# Patient Record
Sex: MALE | Race: WHITE | NOT HISPANIC OR LATINO | URBAN - METROPOLITAN AREA
[De-identification: names, ages, dates, MRNs, and addresses within clinical notes are randomized per-mention and may not be internally consistent; named-entity substitution may affect disease eponyms.]

---

## 2018-04-20 ENCOUNTER — APPOINTMENT (OUTPATIENT)
Dept: RADIOLOGY | Facility: MEDICAL CENTER | Age: 43
End: 2018-04-20
Attending: EMERGENCY MEDICINE
Payer: COMMERCIAL

## 2018-04-20 ENCOUNTER — HOSPITAL ENCOUNTER (OUTPATIENT)
Facility: MEDICAL CENTER | Age: 43
End: 2018-04-21
Attending: EMERGENCY MEDICINE | Admitting: INTERNAL MEDICINE
Payer: COMMERCIAL

## 2018-04-20 ENCOUNTER — RESOLUTE PROFESSIONAL BILLING HOSPITAL PROF FEE (OUTPATIENT)
Dept: HOSPITALIST | Facility: MEDICAL CENTER | Age: 43
End: 2018-04-20
Payer: COMMERCIAL

## 2018-04-20 DIAGNOSIS — I50.9 CONGESTIVE HEART FAILURE, UNSPECIFIED CONGESTIVE HEART FAILURE CHRONICITY, UNSPECIFIED CONGESTIVE HEART FAILURE TYPE: ICD-10-CM

## 2018-04-20 DIAGNOSIS — R60.9 EDEMA, UNSPECIFIED TYPE: ICD-10-CM

## 2018-04-20 PROBLEM — E87.70 FLUID OVERLOAD: Status: ACTIVE | Noted: 2018-04-20

## 2018-04-20 PROBLEM — F17.200 SMOKING: Status: ACTIVE | Noted: 2018-04-20

## 2018-04-20 PROBLEM — E88.09 HYPOALBUMINEMIA: Status: ACTIVE | Noted: 2018-04-20

## 2018-04-20 PROBLEM — N17.9 AKI (ACUTE KIDNEY INJURY) (HCC): Status: ACTIVE | Noted: 2018-04-20

## 2018-04-20 PROBLEM — N04.9 NEPHROTIC SYNDROME: Status: ACTIVE | Noted: 2018-04-20

## 2018-04-20 LAB
ALBUMIN SERPL BCP-MCNC: 1.6 G/DL (ref 3.2–4.9)
ALBUMIN/GLOB SERPL: 0.5 G/DL
ALP SERPL-CCNC: 197 U/L (ref 30–99)
ALT SERPL-CCNC: 8 U/L (ref 2–50)
ANION GAP SERPL CALC-SCNC: 7 MMOL/L (ref 0–11.9)
APTT PPP: 35.7 SEC (ref 24.7–36)
AST SERPL-CCNC: 16 U/L (ref 12–45)
BASOPHILS # BLD AUTO: 1.2 % (ref 0–1.8)
BASOPHILS # BLD: 0.11 K/UL (ref 0–0.12)
BILIRUB SERPL-MCNC: 0.1 MG/DL (ref 0.1–1.5)
BNP SERPL-MCNC: 156 PG/ML (ref 0–100)
BUN SERPL-MCNC: 26 MG/DL (ref 8–22)
CALCIUM SERPL-MCNC: 7.4 MG/DL (ref 8.5–10.5)
CHLORIDE SERPL-SCNC: 108 MMOL/L (ref 96–112)
CO2 SERPL-SCNC: 20 MMOL/L (ref 20–33)
CREAT SERPL-MCNC: 1.78 MG/DL (ref 0.5–1.4)
EOSINOPHIL # BLD AUTO: 0.36 K/UL (ref 0–0.51)
EOSINOPHIL NFR BLD: 3.9 % (ref 0–6.9)
ERYTHROCYTE [DISTWIDTH] IN BLOOD BY AUTOMATED COUNT: 45.1 FL (ref 35.9–50)
GLOBULIN SER CALC-MCNC: 3.5 G/DL (ref 1.9–3.5)
GLUCOSE SERPL-MCNC: 93 MG/DL (ref 65–99)
HCT VFR BLD AUTO: 31.6 % (ref 42–52)
HGB BLD-MCNC: 10.4 G/DL (ref 14–18)
IMM GRANULOCYTES # BLD AUTO: 0.03 K/UL (ref 0–0.11)
IMM GRANULOCYTES NFR BLD AUTO: 0.3 % (ref 0–0.9)
INR PPP: 1.07 (ref 0.87–1.13)
LIPASE SERPL-CCNC: 12 U/L (ref 11–82)
LYMPHOCYTES # BLD AUTO: 2.63 K/UL (ref 1–4.8)
LYMPHOCYTES NFR BLD: 28.5 % (ref 22–41)
MCH RBC QN AUTO: 27.9 PG (ref 27–33)
MCHC RBC AUTO-ENTMCNC: 32.9 G/DL (ref 33.7–35.3)
MCV RBC AUTO: 84.7 FL (ref 81.4–97.8)
MONOCYTES # BLD AUTO: 0.79 K/UL (ref 0–0.85)
MONOCYTES NFR BLD AUTO: 8.5 % (ref 0–13.4)
NEUTROPHILS # BLD AUTO: 5.32 K/UL (ref 1.82–7.42)
NEUTROPHILS NFR BLD: 57.6 % (ref 44–72)
NRBC # BLD AUTO: 0 K/UL
NRBC BLD-RTO: 0 /100 WBC
PLATELET # BLD AUTO: 437 K/UL (ref 164–446)
PMV BLD AUTO: 9.2 FL (ref 9–12.9)
POTASSIUM SERPL-SCNC: 3.9 MMOL/L (ref 3.6–5.5)
PROT SERPL-MCNC: 5.1 G/DL (ref 6–8.2)
PROTHROMBIN TIME: 13.6 SEC (ref 12–14.6)
RBC # BLD AUTO: 3.73 M/UL (ref 4.7–6.1)
SODIUM SERPL-SCNC: 135 MMOL/L (ref 135–145)
TROPONIN I SERPL-MCNC: 0.01 NG/ML (ref 0–0.04)
WBC # BLD AUTO: 9.2 K/UL (ref 4.8–10.8)

## 2018-04-20 PROCEDURE — 84484 ASSAY OF TROPONIN QUANT: CPT

## 2018-04-20 PROCEDURE — 306589 SLEEVE,VASO CALF LARGE: Performed by: INTERNAL MEDICINE

## 2018-04-20 PROCEDURE — 99407 BEHAV CHNG SMOKING > 10 MIN: CPT | Performed by: INTERNAL MEDICINE

## 2018-04-20 PROCEDURE — 99285 EMERGENCY DEPT VISIT HI MDM: CPT

## 2018-04-20 PROCEDURE — 99220 PR INITIAL OBSERVATION CARE,LEVL III: CPT | Mod: 25 | Performed by: INTERNAL MEDICINE

## 2018-04-20 PROCEDURE — 85730 THROMBOPLASTIN TIME PARTIAL: CPT

## 2018-04-20 PROCEDURE — 96372 THER/PROPH/DIAG INJ SC/IM: CPT | Mod: XU

## 2018-04-20 PROCEDURE — 700102 HCHG RX REV CODE 250 W/ 637 OVERRIDE(OP): Performed by: INTERNAL MEDICINE

## 2018-04-20 PROCEDURE — G0378 HOSPITAL OBSERVATION PER HR: HCPCS

## 2018-04-20 PROCEDURE — 71045 X-RAY EXAM CHEST 1 VIEW: CPT

## 2018-04-20 PROCEDURE — 93005 ELECTROCARDIOGRAM TRACING: CPT | Performed by: EMERGENCY MEDICINE

## 2018-04-20 PROCEDURE — 96374 THER/PROPH/DIAG INJ IV PUSH: CPT

## 2018-04-20 PROCEDURE — A9270 NON-COVERED ITEM OR SERVICE: HCPCS | Performed by: INTERNAL MEDICINE

## 2018-04-20 PROCEDURE — 85610 PROTHROMBIN TIME: CPT

## 2018-04-20 PROCEDURE — 96375 TX/PRO/DX INJ NEW DRUG ADDON: CPT

## 2018-04-20 PROCEDURE — 85025 COMPLETE CBC W/AUTO DIFF WBC: CPT

## 2018-04-20 PROCEDURE — 700111 HCHG RX REV CODE 636 W/ 250 OVERRIDE (IP): Performed by: INTERNAL MEDICINE

## 2018-04-20 PROCEDURE — 83690 ASSAY OF LIPASE: CPT

## 2018-04-20 PROCEDURE — 83880 ASSAY OF NATRIURETIC PEPTIDE: CPT

## 2018-04-20 PROCEDURE — 700101 HCHG RX REV CODE 250: Performed by: INTERNAL MEDICINE

## 2018-04-20 PROCEDURE — 80053 COMPREHEN METABOLIC PANEL: CPT

## 2018-04-20 RX ORDER — AMOXICILLIN 250 MG
2 CAPSULE ORAL 2 TIMES DAILY
Status: DISCONTINUED | OUTPATIENT
Start: 2018-04-20 | End: 2018-04-21 | Stop reason: HOSPADM

## 2018-04-20 RX ORDER — HEPARIN SODIUM 5000 [USP'U]/ML
5000 INJECTION, SOLUTION INTRAVENOUS; SUBCUTANEOUS EVERY 8 HOURS
Status: DISCONTINUED | OUTPATIENT
Start: 2018-04-20 | End: 2018-04-21 | Stop reason: HOSPADM

## 2018-04-20 RX ORDER — PREDNISONE 20 MG/1
80 TABLET ORAL EVERY MORNING
Status: ON HOLD | COMMUNITY
End: 2018-04-21

## 2018-04-20 RX ORDER — NICOTINE 21 MG/24HR
14 PATCH, TRANSDERMAL 24 HOURS TRANSDERMAL
Status: DISCONTINUED | OUTPATIENT
Start: 2018-04-20 | End: 2018-04-21 | Stop reason: HOSPADM

## 2018-04-20 RX ORDER — PREDNISONE 20 MG/1
80 TABLET ORAL EVERY MORNING
Status: DISCONTINUED | OUTPATIENT
Start: 2018-04-20 | End: 2018-04-21 | Stop reason: HOSPADM

## 2018-04-20 RX ORDER — LABETALOL HYDROCHLORIDE 5 MG/ML
5 INJECTION, SOLUTION INTRAVENOUS EVERY 4 HOURS PRN
Status: DISCONTINUED | OUTPATIENT
Start: 2018-04-20 | End: 2018-04-21 | Stop reason: HOSPADM

## 2018-04-20 RX ORDER — LISINOPRIL 20 MG/1
20 TABLET ORAL EVERY MORNING
Status: ON HOLD | COMMUNITY
End: 2018-04-21

## 2018-04-20 RX ORDER — LISINOPRIL 20 MG/1
20 TABLET ORAL EVERY MORNING
Status: DISCONTINUED | OUTPATIENT
Start: 2018-04-20 | End: 2018-04-21 | Stop reason: HOSPADM

## 2018-04-20 RX ORDER — POLYETHYLENE GLYCOL 3350 17 G/17G
1 POWDER, FOR SOLUTION ORAL
Status: DISCONTINUED | OUTPATIENT
Start: 2018-04-20 | End: 2018-04-21 | Stop reason: HOSPADM

## 2018-04-20 RX ORDER — BISACODYL 10 MG
10 SUPPOSITORY, RECTAL RECTAL
Status: DISCONTINUED | OUTPATIENT
Start: 2018-04-20 | End: 2018-04-21 | Stop reason: HOSPADM

## 2018-04-20 RX ORDER — FUROSEMIDE 10 MG/ML
40 INJECTION INTRAMUSCULAR; INTRAVENOUS
Status: DISCONTINUED | OUTPATIENT
Start: 2018-04-20 | End: 2018-04-21 | Stop reason: HOSPADM

## 2018-04-20 RX ORDER — ACETAMINOPHEN 325 MG/1
650 TABLET ORAL EVERY 6 HOURS PRN
Status: DISCONTINUED | OUTPATIENT
Start: 2018-04-20 | End: 2018-04-21 | Stop reason: HOSPADM

## 2018-04-20 RX ORDER — FUROSEMIDE 80 MG
80 TABLET ORAL EVERY MORNING
Status: ON HOLD | COMMUNITY
End: 2018-04-21

## 2018-04-20 RX ADMIN — PREDNISONE 80 MG: 20 TABLET ORAL at 17:21

## 2018-04-20 RX ADMIN — FUROSEMIDE 40 MG: 10 INJECTION, SOLUTION INTRAMUSCULAR; INTRAVENOUS at 17:25

## 2018-04-20 RX ADMIN — LISINOPRIL 20 MG: 20 TABLET ORAL at 17:21

## 2018-04-20 RX ADMIN — NICOTINE 14 MG: 14 PATCH, EXTENDED RELEASE TRANSDERMAL at 17:20

## 2018-04-20 RX ADMIN — LABETALOL HYDROCHLORIDE 5 MG: 5 INJECTION, SOLUTION INTRAVENOUS at 19:58

## 2018-04-20 RX ADMIN — HEPARIN SODIUM 5000 UNITS: 5000 INJECTION, SOLUTION INTRAVENOUS; SUBCUTANEOUS at 21:26

## 2018-04-20 RX ADMIN — ACETAMINOPHEN 650 MG: 325 TABLET, FILM COATED ORAL at 19:58

## 2018-04-20 ASSESSMENT — PATIENT HEALTH QUESTIONNAIRE - PHQ9
1. LITTLE INTEREST OR PLEASURE IN DOING THINGS: NOT AT ALL
2. FEELING DOWN, DEPRESSED, IRRITABLE, OR HOPELESS: NOT AT ALL
SUM OF ALL RESPONSES TO PHQ9 QUESTIONS 1 AND 2: 0

## 2018-04-20 ASSESSMENT — ENCOUNTER SYMPTOMS
WEIGHT LOSS: 0
DIARRHEA: 0
ABDOMINAL PAIN: 0
EYE PAIN: 0
SPEECH CHANGE: 0
BACK PAIN: 0
HEARTBURN: 0
NECK PAIN: 0
FALLS: 0
PND: 0
CHILLS: 0
VOMITING: 0
DEPRESSION: 0
SEIZURES: 0
NAUSEA: 0
HEADACHES: 0
BLURRED VISION: 0
FEVER: 0
WEAKNESS: 0
TREMORS: 0
WHEEZING: 0
PALPITATIONS: 0
CONSTIPATION: 0
DIZZINESS: 0
SHORTNESS OF BREATH: 0
SPUTUM PRODUCTION: 0
COUGH: 0

## 2018-04-20 ASSESSMENT — LIFESTYLE VARIABLES
ALCOHOL_USE: NO
SUBSTANCE_ABUSE: 0
EVER_SMOKED: YES

## 2018-04-20 ASSESSMENT — PAIN SCALES - GENERAL
PAINLEVEL_OUTOF10: 8
PAINLEVEL_OUTOF10: 8

## 2018-04-20 NOTE — ED PROVIDER NOTES
"ED Provider Note    CHIEF COMPLAINT  Chief Complaint   Patient presents with   • Congestive Heart Failure   • Peripheral Edema   • Abdominal Swelling   • Hypertension       HPI  Td Watt is a 43 y.o. male here for evaluation of peripheral edema, swelling of the abdomen, blood pressure issues and possible heart failure. He states he has intermittent left-sided chest discomfort, and it is associated with severe shortness of breath. He states he recently had to have a paracentesis to remove \"4 L of fluid\" off of his abdomen. He denies back pain, or leg pain.  Pt is here visiting, and states he has a history of intermittent issues, and has required paracentesis in the past.     PAST MEDICAL HISTORY   has a past medical history of Congestive heart failure (CMS-[facility]) and Hypertension.    SOCIAL HISTORY  Social History     Social History Main Topics   • Smoking status: Current Every Day Smoker   • Smokeless tobacco: Not on file   • Alcohol use Yes   • Drug use: Yes      Comment: marijuana   • Sexual activity: Not on file       SURGICAL HISTORY  patient denies any surgical history    CURRENT MEDICATIONS  Home Medications    **Home medications have not yet been reviewed for this encounter**         ALLERGIES  No Known Allergies    REVIEW OF SYSTEMS  See HPI for further details. Review of systems as above, otherwise all other systems are negative.     PHYSICAL EXAM  Constitutional: Well developed, well nourished. Mild acute distress.  HEENT: Normocephalic, atraumatic. Posterior pharynx clear and moist.  Eyes:  EOMI. Normal sclera.  Neck: Supple, Full range of motion, nontender.  Chest/Pulmonary: clear to ausculation. Symmetrical expansion.   Cardio: Regular rate and rhythm with no murmur.   Abdomen: Soft, nontender. No peritoneal signs. No guarding. No palpable masses.  Back: No CVA tenderness, nontender midline, no step offs.  Musculoskeletal: No deformity, 2 plus edema to bilateral hands, and lower extremities.  " neurovascular intact.   Neuro: Clear speech, appropriate, cooperative, cranial nerves II-XII grossly intact.  Psych: Normal mood and affect  Skin; multiple tattoos noted. Warm and dry    Results for orders placed or performed during the hospital encounter of 04/20/18   Troponin   Result Value Ref Range    Troponin I 0.01 0.00 - 0.04 ng/mL   Btype Natriuretic Peptide   Result Value Ref Range    B Natriuretic Peptide 156 (H) 0 - 100 pg/mL   CBC with Differential   Result Value Ref Range    WBC 9.2 4.8 - 10.8 K/uL    RBC 3.73 (L) 4.70 - 6.10 M/uL    Hemoglobin 10.4 (L) 14.0 - 18.0 g/dL    Hematocrit 31.6 (L) 42.0 - 52.0 %    MCV 84.7 81.4 - 97.8 fL    MCH 27.9 27.0 - 33.0 pg    MCHC 32.9 (L) 33.7 - 35.3 g/dL    RDW 45.1 35.9 - 50.0 fL    Platelet Count 437 164 - 446 K/uL    MPV 9.2 9.0 - 12.9 fL    Neutrophils-Polys 57.60 44.00 - 72.00 %    Lymphocytes 28.50 22.00 - 41.00 %    Monocytes 8.50 0.00 - 13.40 %    Eosinophils 3.90 0.00 - 6.90 %    Basophils 1.20 0.00 - 1.80 %    Immature Granulocytes 0.30 0.00 - 0.90 %    Nucleated RBC 0.00 /100 WBC    Neutrophils (Absolute) 5.32 1.82 - 7.42 K/uL    Lymphs (Absolute) 2.63 1.00 - 4.80 K/uL    Monos (Absolute) 0.79 0.00 - 0.85 K/uL    Eos (Absolute) 0.36 0.00 - 0.51 K/uL    Baso (Absolute) 0.11 0.00 - 0.12 K/uL    Immature Granulocytes (abs) 0.03 0.00 - 0.11 K/uL    NRBC (Absolute) 0.00 K/uL   Complete Metabolic Panel (CMP)   Result Value Ref Range    Sodium 135 135 - 145 mmol/L    Potassium 3.9 3.6 - 5.5 mmol/L    Chloride 108 96 - 112 mmol/L    Co2 20 20 - 33 mmol/L    Anion Gap 7.0 0.0 - 11.9    Glucose 93 65 - 99 mg/dL    Bun 26 (H) 8 - 22 mg/dL    Creatinine 1.78 (H) 0.50 - 1.40 mg/dL    Calcium 7.4 (L) 8.5 - 10.5 mg/dL    AST(SGOT) 16 12 - 45 U/L    ALT(SGPT) 8 2 - 50 U/L    Alkaline Phosphatase 197 (H) 30 - 99 U/L    Total Bilirubin 0.1 0.1 - 1.5 mg/dL    Albumin 1.6 (L) 3.2 - 4.9 g/dL    Total Protein 5.1 (L) 6.0 - 8.2 g/dL    Globulin 3.5 1.9 - 3.5 g/dL    A-G Ratio  0.5 g/dL   Prothrombin Time   Result Value Ref Range    PT 13.6 12.0 - 14.6 sec    INR 1.07 0.87 - 1.13   APTT   Result Value Ref Range    APTT 35.7 24.7 - 36.0 sec   Lipase   Result Value Ref Range    Lipase 12 11 - 82 U/L   ESTIMATED GFR   Result Value Ref Range    GFR If African American 50 (A) >60 mL/min/1.73 m 2    GFR If Non  42 (A) >60 mL/min/1.73 m 2   EKG (ER)   Result Value Ref Range    Report       [facility]    Test Date:  2018  Pt Name:    JAMAL WHITE                  Department: ER  MRN:        [id number]                      Room:        28  Gender:     Male                         Technician: [id number]  :        1975                   Requested By:OSWALDO CHOWDARY  Order #:    [id number]                    Reading MD:    Measurements  Intervals                                Axis  Rate:       90                           P:          75  SD:         136                          QRS:        90  QRSD:       96                           T:          66  QT:         368  QTc:        451    Interpretive Statements  SINUS RHYTHM  BORDERLINE RIGHT AXIS DEVIATION  LOW VOLTAGE THROUGHOUT  BORDERLINE T ABNORMALITIES, ANT-LAT LEADS  BASELINE WANDER IN LEAD(S) V5  No previous ECG available for comparison       DX-CHEST-LIMITED (1 VIEW)   Final Result      No acute cardiopulmonary disease.            PROCEDURES     MEDICAL RECORD  I have reviewed patient's medical record and pertinent results are listed above.    COURSE & MEDICAL DECISION MAKING  I have reviewed any medical record information, laboratory studies and radiographic results as noted above.    4:25 PM  The pt will be admitted to Dr. Nolan.  The pt reports to him, a long history of nephrotic syndrome.   He is comfortable, stable, and nontoxic appearing.       FINAL IMPRESSION  1. Edema, unspecified type    2. Congestive heart failure, unspecified congestive heart failure chronicity, unspecified  congestive heart failure type (CMS-HCC)        Electronically signed by: Siva Frederick, 4/20/2018 3:56 PM

## 2018-04-20 NOTE — ED NOTES
Med rec complete per pt at bedside  Allergies reviewed - NKDA  Pt reports he has been out of his medications since he arrived in [place] on Tuesday  Pt also reports that he has been hospitalized 3 times in the last two months, received unknown ABX  Last discharge was 18 days ago, per pt

## 2018-04-20 NOTE — ED TRIAGE NOTES
.  Chief Complaint   Patient presents with   • Congestive Heart Failure   • Peripheral Edema   • Abdominal Swelling   • Hypertension   biba from home pt arrived Tuesday from [facility] to visit mother. Reports ran out of all medications 4 days ago. Pt with significant edema. Labs collected.

## 2018-04-21 ENCOUNTER — HOSPITAL ENCOUNTER (INPATIENT)
Facility: MEDICAL CENTER | Age: 43
LOS: 2 days | DRG: 871 | End: 2018-04-24
Attending: EMERGENCY MEDICINE | Admitting: HOSPITALIST
Payer: COMMERCIAL

## 2018-04-21 ENCOUNTER — APPOINTMENT (OUTPATIENT)
Dept: RADIOLOGY | Facility: MEDICAL CENTER | Age: 43
DRG: 871 | End: 2018-04-21
Attending: EMERGENCY MEDICINE
Payer: COMMERCIAL

## 2018-04-21 VITALS
DIASTOLIC BLOOD PRESSURE: 101 MMHG | RESPIRATION RATE: 19 BRPM | OXYGEN SATURATION: 96 % | HEIGHT: 73 IN | HEART RATE: 85 BPM | TEMPERATURE: 97.6 F | SYSTOLIC BLOOD PRESSURE: 157 MMHG | WEIGHT: 234.35 LBS | BODY MASS INDEX: 31.06 KG/M2

## 2018-04-21 DIAGNOSIS — N17.9 AKI (ACUTE KIDNEY INJURY) (HCC): ICD-10-CM

## 2018-04-21 DIAGNOSIS — E87.70 HYPERVOLEMIA, UNSPECIFIED HYPERVOLEMIA TYPE: ICD-10-CM

## 2018-04-21 LAB
ALBUMIN SERPL BCP-MCNC: 1.9 G/DL (ref 3.2–4.9)
ALBUMIN/GLOB SERPL: 0.5 G/DL
ALP SERPL-CCNC: 198 U/L (ref 30–99)
ALT SERPL-CCNC: 8 U/L (ref 2–50)
ANION GAP SERPL CALC-SCNC: 10 MMOL/L (ref 0–11.9)
ANION GAP SERPL CALC-SCNC: 8 MMOL/L (ref 0–11.9)
AST SERPL-CCNC: 14 U/L (ref 12–45)
BASOPHILS # BLD AUTO: 0.2 % (ref 0–1.8)
BASOPHILS # BLD AUTO: 0.2 % (ref 0–1.8)
BASOPHILS # BLD: 0.02 K/UL (ref 0–0.12)
BASOPHILS # BLD: 0.04 K/UL (ref 0–0.12)
BILIRUB SERPL-MCNC: 0.1 MG/DL (ref 0.1–1.5)
BNP SERPL-MCNC: 123 PG/ML (ref 0–100)
BUN SERPL-MCNC: 30 MG/DL (ref 8–22)
BUN SERPL-MCNC: 37 MG/DL (ref 8–22)
CALCIUM SERPL-MCNC: 7.5 MG/DL (ref 8.5–10.5)
CALCIUM SERPL-MCNC: 7.5 MG/DL (ref 8.5–10.5)
CHLORIDE SERPL-SCNC: 109 MMOL/L (ref 96–112)
CHLORIDE SERPL-SCNC: 111 MMOL/L (ref 96–112)
CO2 SERPL-SCNC: 16 MMOL/L (ref 20–33)
CO2 SERPL-SCNC: 17 MMOL/L (ref 20–33)
CREAT SERPL-MCNC: 1.88 MG/DL (ref 0.5–1.4)
CREAT SERPL-MCNC: 2.67 MG/DL (ref 0.5–1.4)
EOSINOPHIL # BLD AUTO: 0 K/UL (ref 0–0.51)
EOSINOPHIL # BLD AUTO: 0 K/UL (ref 0–0.51)
EOSINOPHIL NFR BLD: 0 % (ref 0–6.9)
EOSINOPHIL NFR BLD: 0 % (ref 0–6.9)
ERYTHROCYTE [DISTWIDTH] IN BLOOD BY AUTOMATED COUNT: 43.8 FL (ref 35.9–50)
ERYTHROCYTE [DISTWIDTH] IN BLOOD BY AUTOMATED COUNT: 44.1 FL (ref 35.9–50)
GLOBULIN SER CALC-MCNC: 4 G/DL (ref 1.9–3.5)
GLUCOSE SERPL-MCNC: 125 MG/DL (ref 65–99)
GLUCOSE SERPL-MCNC: 160 MG/DL (ref 65–99)
HCT VFR BLD AUTO: 32.9 % (ref 42–52)
HCT VFR BLD AUTO: 33.9 % (ref 42–52)
HGB BLD-MCNC: 10.9 G/DL (ref 14–18)
HGB BLD-MCNC: 10.9 G/DL (ref 14–18)
IMM GRANULOCYTES # BLD AUTO: 0.03 K/UL (ref 0–0.11)
IMM GRANULOCYTES # BLD AUTO: 0.18 K/UL (ref 0–0.11)
IMM GRANULOCYTES NFR BLD AUTO: 0.3 % (ref 0–0.9)
IMM GRANULOCYTES NFR BLD AUTO: 0.7 % (ref 0–0.9)
LYMPHOCYTES # BLD AUTO: 1.42 K/UL (ref 1–4.8)
LYMPHOCYTES # BLD AUTO: 2.46 K/UL (ref 1–4.8)
LYMPHOCYTES NFR BLD: 10.1 % (ref 22–41)
LYMPHOCYTES NFR BLD: 14.9 % (ref 22–41)
MCH RBC QN AUTO: 27.3 PG (ref 27–33)
MCH RBC QN AUTO: 27.7 PG (ref 27–33)
MCHC RBC AUTO-ENTMCNC: 32.2 G/DL (ref 33.7–35.3)
MCHC RBC AUTO-ENTMCNC: 33.1 G/DL (ref 33.7–35.3)
MCV RBC AUTO: 83.5 FL (ref 81.4–97.8)
MCV RBC AUTO: 84.8 FL (ref 81.4–97.8)
MONOCYTES # BLD AUTO: 0.07 K/UL (ref 0–0.85)
MONOCYTES # BLD AUTO: 0.71 K/UL (ref 0–0.85)
MONOCYTES NFR BLD AUTO: 0.7 % (ref 0–13.4)
MONOCYTES NFR BLD AUTO: 2.9 % (ref 0–13.4)
NEUTROPHILS # BLD AUTO: 20.97 K/UL (ref 1.82–7.42)
NEUTROPHILS # BLD AUTO: 7.96 K/UL (ref 1.82–7.42)
NEUTROPHILS NFR BLD: 83.9 % (ref 44–72)
NEUTROPHILS NFR BLD: 86.1 % (ref 44–72)
NRBC # BLD AUTO: 0 K/UL
NRBC # BLD AUTO: 0 K/UL
NRBC BLD-RTO: 0 /100 WBC
NRBC BLD-RTO: 0 /100 WBC
PLATELET # BLD AUTO: 432 K/UL (ref 164–446)
PLATELET # BLD AUTO: 589 K/UL (ref 164–446)
PMV BLD AUTO: 9 FL (ref 9–12.9)
PMV BLD AUTO: 9.2 FL (ref 9–12.9)
POTASSIUM SERPL-SCNC: 4.1 MMOL/L (ref 3.6–5.5)
POTASSIUM SERPL-SCNC: 4.4 MMOL/L (ref 3.6–5.5)
PROT SERPL-MCNC: 5.9 G/DL (ref 6–8.2)
RBC # BLD AUTO: 3.94 M/UL (ref 4.7–6.1)
RBC # BLD AUTO: 4 M/UL (ref 4.7–6.1)
SODIUM SERPL-SCNC: 134 MMOL/L (ref 135–145)
SODIUM SERPL-SCNC: 137 MMOL/L (ref 135–145)
WBC # BLD AUTO: 24.4 K/UL (ref 4.8–10.8)
WBC # BLD AUTO: 9.5 K/UL (ref 4.8–10.8)

## 2018-04-21 PROCEDURE — 93005 ELECTROCARDIOGRAM TRACING: CPT

## 2018-04-21 PROCEDURE — 3E0234Z INTRODUCTION OF SERUM, TOXOID AND VACCINE INTO MUSCLE, PERCUTANEOUS APPROACH: ICD-10-PCS | Performed by: EMERGENCY MEDICINE

## 2018-04-21 PROCEDURE — 700111 HCHG RX REV CODE 636 W/ 250 OVERRIDE (IP): Performed by: INTERNAL MEDICINE

## 2018-04-21 PROCEDURE — 700111 HCHG RX REV CODE 636 W/ 250 OVERRIDE (IP): Performed by: NURSE PRACTITIONER

## 2018-04-21 PROCEDURE — G0378 HOSPITAL OBSERVATION PER HR: HCPCS

## 2018-04-21 PROCEDURE — 71045 X-RAY EXAM CHEST 1 VIEW: CPT

## 2018-04-21 PROCEDURE — 99217 PR OBSERVATION CARE DISCHARGE: CPT | Performed by: HOSPITALIST

## 2018-04-21 PROCEDURE — A9270 NON-COVERED ITEM OR SERVICE: HCPCS | Performed by: INTERNAL MEDICINE

## 2018-04-21 PROCEDURE — A9270 NON-COVERED ITEM OR SERVICE: HCPCS | Performed by: EMERGENCY MEDICINE

## 2018-04-21 PROCEDURE — 36415 COLL VENOUS BLD VENIPUNCTURE: CPT

## 2018-04-21 PROCEDURE — 85025 COMPLETE CBC W/AUTO DIFF WBC: CPT | Mod: 91

## 2018-04-21 PROCEDURE — 85025 COMPLETE CBC W/AUTO DIFF WBC: CPT

## 2018-04-21 PROCEDURE — 93005 ELECTROCARDIOGRAM TRACING: CPT | Performed by: EMERGENCY MEDICINE

## 2018-04-21 PROCEDURE — 700102 HCHG RX REV CODE 250 W/ 637 OVERRIDE(OP): Performed by: NURSE PRACTITIONER

## 2018-04-21 PROCEDURE — 96372 THER/PROPH/DIAG INJ SC/IM: CPT

## 2018-04-21 PROCEDURE — 80307 DRUG TEST PRSMV CHEM ANLYZR: CPT

## 2018-04-21 PROCEDURE — 700102 HCHG RX REV CODE 250 W/ 637 OVERRIDE(OP): Performed by: INTERNAL MEDICINE

## 2018-04-21 PROCEDURE — 80053 COMPREHEN METABOLIC PANEL: CPT

## 2018-04-21 PROCEDURE — 90471 IMMUNIZATION ADMIN: CPT

## 2018-04-21 PROCEDURE — 83880 ASSAY OF NATRIURETIC PEPTIDE: CPT

## 2018-04-21 PROCEDURE — 90732 PPSV23 VACC 2 YRS+ SUBQ/IM: CPT | Performed by: INTERNAL MEDICINE

## 2018-04-21 PROCEDURE — 80048 BASIC METABOLIC PNL TOTAL CA: CPT

## 2018-04-21 PROCEDURE — 700102 HCHG RX REV CODE 250 W/ 637 OVERRIDE(OP): Performed by: EMERGENCY MEDICINE

## 2018-04-21 PROCEDURE — A9270 NON-COVERED ITEM OR SERVICE: HCPCS | Performed by: NURSE PRACTITIONER

## 2018-04-21 PROCEDURE — 96376 TX/PRO/DX INJ SAME DRUG ADON: CPT

## 2018-04-21 PROCEDURE — 83036 HEMOGLOBIN GLYCOSYLATED A1C: CPT

## 2018-04-21 PROCEDURE — 99285 EMERGENCY DEPT VISIT HI MDM: CPT

## 2018-04-21 RX ORDER — LISINOPRIL 10 MG/1
10 TABLET ORAL ONCE
Status: COMPLETED | OUTPATIENT
Start: 2018-04-21 | End: 2018-04-21

## 2018-04-21 RX ORDER — PREDNISONE 20 MG/1
80 TABLET ORAL EVERY MORNING
Qty: 120 TAB | Refills: 0 | Status: ON HOLD | OUTPATIENT
Start: 2018-04-21 | End: 2018-04-26

## 2018-04-21 RX ORDER — FUROSEMIDE 10 MG/ML
40 INJECTION INTRAMUSCULAR; INTRAVENOUS ONCE
Status: COMPLETED | OUTPATIENT
Start: 2018-04-21 | End: 2018-04-21

## 2018-04-21 RX ORDER — PREDNISONE 20 MG/1
80 TABLET ORAL EVERY MORNING
Qty: 30 TAB | Refills: 0 | Status: SHIPPED | OUTPATIENT
Start: 2018-04-21 | End: 2018-04-21

## 2018-04-21 RX ORDER — FUROSEMIDE 80 MG
80 TABLET ORAL EVERY MORNING
Qty: 30 TAB | Refills: 0 | Status: SHIPPED | OUTPATIENT
Start: 2018-04-21 | End: 2018-04-21

## 2018-04-21 RX ORDER — LISINOPRIL 20 MG/1
20 TABLET ORAL EVERY MORNING
Qty: 30 TAB | Refills: 0 | Status: SHIPPED | OUTPATIENT
Start: 2018-04-21 | End: 2018-04-21

## 2018-04-21 RX ORDER — POTASSIUM CHLORIDE 20 MEQ/1
20 TABLET, EXTENDED RELEASE ORAL ONCE
Status: COMPLETED | OUTPATIENT
Start: 2018-04-21 | End: 2018-04-21

## 2018-04-21 RX ORDER — FUROSEMIDE 80 MG
80 TABLET ORAL EVERY MORNING
Qty: 30 TAB | Refills: 0 | Status: ON HOLD | OUTPATIENT
Start: 2018-04-21 | End: 2018-04-26

## 2018-04-21 RX ORDER — LISINOPRIL 20 MG/1
20 TABLET ORAL EVERY MORNING
Qty: 30 TAB | Refills: 0 | Status: ON HOLD | OUTPATIENT
Start: 2018-04-21 | End: 2018-04-26

## 2018-04-21 RX ADMIN — FUROSEMIDE 40 MG: 10 INJECTION, SOLUTION INTRAMUSCULAR; INTRAVENOUS at 05:43

## 2018-04-21 RX ADMIN — LISINOPRIL 10 MG: 10 TABLET ORAL at 22:23

## 2018-04-21 RX ADMIN — PREDNISONE 80 MG: 20 TABLET ORAL at 08:20

## 2018-04-21 RX ADMIN — POTASSIUM CHLORIDE 20 MEQ: 1500 TABLET, EXTENDED RELEASE ORAL at 10:04

## 2018-04-21 RX ADMIN — LISINOPRIL 20 MG: 20 TABLET ORAL at 08:21

## 2018-04-21 RX ADMIN — HEPARIN SODIUM 5000 UNITS: 5000 INJECTION, SOLUTION INTRAVENOUS; SUBCUTANEOUS at 05:44

## 2018-04-21 RX ADMIN — PNEUMOCOCCAL VACCINE POLYVALENT 25 MCG
25; 25; 25; 25; 25; 25; 25; 25; 25; 25; 25; 25; 25; 25; 25; 25; 25; 25; 25; 25; 25; 25; 25 INJECTION, SOLUTION INTRAMUSCULAR; SUBCUTANEOUS at 10:36

## 2018-04-21 RX ADMIN — FUROSEMIDE 40 MG: 10 INJECTION, SOLUTION INTRAMUSCULAR; INTRAVENOUS at 10:04

## 2018-04-21 ASSESSMENT — PAIN SCALES - GENERAL
PAINLEVEL_OUTOF10: 8
PAINLEVEL_OUTOF10: 8

## 2018-04-21 ASSESSMENT — PATIENT HEALTH QUESTIONNAIRE - PHQ9
SUM OF ALL RESPONSES TO PHQ9 QUESTIONS 1 AND 2: 0
1. LITTLE INTEREST OR PLEASURE IN DOING THINGS: NOT AT ALL
2. FEELING DOWN, DEPRESSED, IRRITABLE, OR HOPELESS: NOT AT ALL

## 2018-04-21 NOTE — DISCHARGE INSTRUCTIONS
Discharge Instructions    Discharged to home by car with relative. Discharged via walking, hospital escort: Yes.  Special equipment needed: Not Applicable    Be sure to schedule a follow-up appointment with your primary care doctor or any specialists as instructed.     Discharge Plan:   Diet Plan: Discussed  Activity Level: Discussed  Smoking Cessation Offered: Patient Refused  Confirmed Follow up Appointment: Patient to Call and Schedule Appointment  Confirmed Symptoms Management: Discussed  Medication Reconciliation Updated: Yes  Influenza Vaccine Indication: Not indicated: Previously immunized this influenza season and > 8 years of age    I understand that a diet low in cholesterol, fat, and sodium is recommended for good health. Unless I have been given specific instructions below for another diet, I accept this instruction as my diet prescription.   Other diet:   Heart Failure  Heart failure is a condition in which the heart has trouble pumping blood because it has become weak or stiff. This means that the heart does not pump blood efficiently for the body to work well. For some people with heart failure, fluid may back up into the lungs and there may be swelling (edema) in the lower legs. Heart failure is usually a long-term (chronic) condition. It is important for you to take good care of yourself and follow the treatment plan from your health care provider.  What are the causes?  This condition is caused by some health problems, including:  · High blood pressure (hypertension). Hypertension causes the heart muscle to work harder than normal. High blood pressure eventually causes the heart to become stiff and weak.  · Coronary artery disease (CAD). CAD is the buildup of cholesterol and fat (plaques) in the arteries of the heart.  · Heart attack (myocardial infarction). Injured tissue, which is caused by the heart attack, does not contract as well and the heart's ability to pump blood is weakened.  · Abnormal  heart valves. When the heart valves do not open and close properly, the heart muscle must pump harder to keep the blood flowing.  · Heart muscle disease (cardiomyopathy or myocarditis). Heart muscle disease is damage to the heart muscle from a variety of causes, such as drug or alcohol abuse, infections, or unknown causes. These can increase the risk of heart failure.  · Lung disease. When the lungs do not work properly, the heart must work harder.  What increases the risk?  Risk of heart failure increases as a person ages. This condition is also more likely to develop in people who:  · Are overweight.  · Are male.  · Smoke or chew tobacco.  · Abuse alcohol or illegal drugs.  · Have taken medicines that can damage the heart, such as chemotherapy drugs.  · Have diabetes.  ¨ High blood sugar (glucose) is associated with high fat (lipid) levels in the blood.  ¨ Diabetes can also damage tiny blood vessels that carry nutrients to the heart muscle.  · Have abnormal heart rhythms.  · Have thyroid problems.  · Have low blood counts (anemia).  What are the signs or symptoms?  Symptoms of this condition include:  · Shortness of breath with activity, such as when climbing stairs.  · Persistent cough.  · Swelling of the feet, ankles, legs, or abdomen.  · Unexplained weight gain.  · Difficulty breathing when lying flat (orthopnea).  · Waking from sleep because of the need to sit up and get more air.  · Rapid heartbeat.  · Fatigue and loss of energy.  · Feeling light-headed, dizzy, or close to fainting.  · Loss of appetite.  · Nausea.  · Increased urination during the night (nocturia).  · Confusion.  How is this diagnosed?  This condition is diagnosed based on:  · Medical history, symptoms, and a physical exam.  · Diagnostic tests, which may include:  ¨ Echocardiogram.  ¨ Electrocardiogram (ECG).  ¨ Chest X-ray.  ¨ Blood tests.  ¨ Exercise stress test.  ¨ Radionuclide scans.  ¨ Cardiac catheterization and angiogram.  How is this  treated?  Treatment for this condition is aimed at managing the symptoms of heart failure. Medicines, behavioral changes, or other treatments may be necessary to treat heart failure.  Medicines   These may include:  · Angiotensin-converting enzyme (ACE) inhibitors. This type of medicine blocks the effects of a blood protein called angiotensin-converting enzyme. ACE inhibitors relax (dilate) the blood vessels and help to lower blood pressure.  · Angiotensin receptor blockers (ARBs). This type of medicine blocks the actions of a blood protein called angiotensin. ARBs dilate the blood vessels and help to lower blood pressure.  · Water pills (diuretics). Diuretics cause the kidneys to remove salt and water from the blood. The extra fluid is removed through urination, leaving a lower volume of blood that the heart has to pump.  · Beta blockers. These improve heart muscle strength and they prevent the heart from beating too quickly.  · Digoxin. This increases the force of the heartbeat.  Healthy behavior changes   These may include:  · Reaching and maintaining a healthy weight.  · Stopping smoking or chewing tobacco.  · Eating heart-healthy foods.  · Limiting or avoiding alcohol.  · Stopping use of street drugs (illegal drugs).  · Physical activity.  Other treatments   These may include:  · Surgery to open blocked coronary arteries or repair damaged heart valves.  · Placement of a biventricular pacemaker to improve heart muscle function (cardiac resynchronization therapy). This device paces both the right ventricle and left ventricle.  · Placement of a device to treat serious abnormal heart rhythms (implantable cardioverter defibrillator, or ICD).  · Placement of a device to improve the pumping ability of the heart (left ventricular assist device, or LVAD).  · Heart transplant. This can cure heart failure, and it is considered for certain patients who do not improve with other therapies.  Follow these instructions at  home:  Medicines  · Take over-the-counter and prescription medicines only as told by your health care provider. Medicines are important in reducing the workload of your heart, slowing the progression of heart failure, and improving your symptoms.  ¨ Do not stop taking your medicine unless your health care provider told you to do that.  ¨ Do not skip any dose of medicine.  ¨ Refill your prescriptions before you run out of medicine. You need your medicines every day.  Eating and drinking  · Eat heart-healthy foods. Talk with a dietitian to make an eating plan that is right for you.  ¨ Choose foods that contain no trans fat and are low in saturated fat and cholesterol. Healthy choices include fresh or frozen fruits and vegetables, fish, lean meats, legumes, fat-free or low-fat dairy products, and whole-grain or high-fiber foods.  ¨ Limit salt (sodium) if directed by your health care provider. Sodium restriction may reduce symptoms of heart failure. Ask a dietitian to recommend heart-healthy seasonings.  ¨ Use healthy cooking methods instead of frying. Healthy methods include roasting, grilling, broiling, baking, poaching, steaming, and stir-frying.  · Limit your fluid intake if directed by your health care provider. Fluid restriction may reduce symptoms of heart failure.  Lifestyle  · Stop smoking or using chewing tobacco. Nicotine and tobacco can damage your heart and your blood vessels. Do not use nicotine gum or patches before talking to your health care provider.  · Limit alcohol intake to no more than 1 drink per day for non-pregnant women and 2 drinks per day for men. One drink equals 12 oz of beer, 5 oz of wine, or 1½ oz of hard liquor.  ¨ Drinking more than that is harmful to your heart. Tell your health care provider if you drink alcohol several times a week.  ¨ Talk with your health care provider about whether any level of alcohol use is safe for you.  ¨ If your heart has already been damaged by alcohol or  you have severe heart failure, drinking alcohol should be stopped completely.  · Stop use of illegal drugs.  · Lose weight if directed by your health care provider. Weight loss may reduce symptoms of heart failure.  · Do moderate physical activity if directed by your health care provider. People who are elderly and people with severe heart failure should consult with a health care provider for physical activity recommendations.  Monitor important information  · Weigh yourself every day. Keeping track of your weight daily helps you to notice excess fluid sooner.  ¨ Weigh yourself every morning after you urinate and before you eat breakfast.  ¨ Wear the same amount of clothing each time you weigh yourself.  ¨ Record your daily weight. Provide your health care provider with your weight record.  · Monitor and record your blood pressure as told by your health care provider.  · Check your pulse as told by your health care provider.  Dealing with extreme temperatures  · If the weather is extremely hot:  ¨ Avoid vigorous physical activity.  ¨ Use air conditioning or fans or seek a cooler location.  ¨ Avoid caffeine and alcohol.  ¨ Wear loose-fitting, lightweight, and light-colored clothing.  · If the weather is extremely cold:  ¨ Avoid vigorous physical activity.  ¨ Layer your clothes.  ¨ Wear mittens or gloves, a hat, and a scarf when you go outside.  ¨ Avoid alcohol.  General instructions  · Manage other health conditions such as hypertension, diabetes, thyroid disease, or abnormal heart rhythms as told by your health care provider.  · Learn to manage stress. If you need help to do this, ask your health care provider.  · Plan rest periods when fatigued.  · Get ongoing education and support as needed.  · Participate in or seek rehabilitation as needed to maintain or improve independence and quality of life.  · Stay up to date with immunizations. Keeping current on pneumococcal and influenza immunizations is especially  important to prevent respiratory infections.  · Keep all follow-up visits as told by your health care provider. This is important.  Contact a health care provider if:  · You have a rapid weight gain.  · You have increasing shortness of breath that is unusual for you.  · You are unable to participate in your usual physical activities.  · You tire easily.  · You cough more than normal, especially with physical activity.  · You have any swelling or more swelling in areas such as your hands, feet, ankles, or abdomen.  · You are unable to sleep because it is hard to breathe.  · You feel like your heart is beating quickly (palpitations).  · You become dizzy or light-headed when you stand up.  Get help right away if:  · You have difficulty breathing.  · You notice or your family notices a change in your awareness, such as having trouble staying awake or having difficulty with concentration.  · You have pain or discomfort in your chest.  · You have an episode of fainting (syncope).  This information is not intended to replace advice given to you by your health care provider. Make sure you discuss any questions you have with your health care provider.  Document Released: 12/18/2006 Document Revised: 08/22/2017 Document Reviewed: 07/12/2017  [employer] Interactive Patient Education © 2017 [employer]      Special Instructions: None    · Is patient discharged on Warfarin / Coumadin?   No     Depression / Suicide Risk    As you are discharged from this [facility] facility, it is important to learn how to keep safe from harming yourself.    Recognize the warning signs:  · Abrupt changes in personality, positive or negative- including increase in energy   · Giving away possessions  · Change in eating patterns- significant weight changes-  positive or negative  · Change in sleeping patterns- unable to sleep or sleeping all the time   · Unwillingness or inability to communicate  · Depression  · Unusual sadness, discouragement  and loneliness  · Talk of wanting to die  · Neglect of personal appearance   · Rebelliousness- reckless behavior  · Withdrawal from people/activities they love  · Confusion- inability to concentrate     If you or a loved one observes any of these behaviors or has concerns about self-harm, here's what you can do:  · Talk about it- your feelings and reasons for harming yourself  · Remove any means that you might use to hurt yourself (examples: pills, rope, extension cords, firearm)  · Get professional help from the community (Mental Health, Substance Abuse, psychological counseling)  · Do not be alone:Call your Safe Contact- someone whom you trust who will be there for you.  · Call your local CRISIS HOTLINE 165-4346 or 919-305-5111  · Call your local Children's Mobile Crisis Response Team [place] (949) 801-1008 or www.[employer]  · Call the toll free National Suicide Prevention Hotlines   · National Suicide Prevention Lifeline 713-273-AVET (2419)  · National Hope Line Network 800-SUICIDE (605-5299)

## 2018-04-21 NOTE — DISCHARGE SUMMARY
CHIEF COMPLAINT ON ADMISSION  Chief Complaint   Patient presents with   • Congestive Heart Failure   • Peripheral Edema   • Abdominal Swelling   • Hypertension       CODE STATUS  Prior    HPI & HOSPITAL COURSE  This is a 43 y.o. male with past medical history significant for HTN, and nephrotic syndrome chronically on prednisone here with fluid overload. Patient is followed by a nephrologist in [facility] and has been on lisinopril, prednisone, and Lasix. He is here in town visiting his mother recently had a stroke and ran out of his medications a week ago.  Hospital workup included a CBC which showed a hemoglobin of 10.4 up to 10.9 this morning, otherwise unremarkable. CMP showed BUN 26, creatinine 1.78, GFR 42, alk phos 197 and AST 16 ALT 8. His is likely his baseline creatinine. Troponin 0.01 with . He was given multiple doses of IV Lasix throughout his hospitalization and this morning with good results. He reports feeling back to his baseline level of functioning and is eager to be discharged so that he can go be with his ill mother. He is given prescriptions for all 3 of the medications (Lasix, prednisone and lisinopril) which he states he will fill locally here at a pharmacy.    The patient met 2-midnight criteria for an inpatient stay at the time of discharge.    Therefore, he is discharged in good and stable condition with close outpatient follow-up.    DISCHARGE PROBLEM LIST  Principal Problem:    Nephrotic syndrome POA: Yes  Active Problems:    CHELSEA (acute kidney injury) (CMS-[facility]) POA: Yes    Hypoalbuminemia POA: Yes    Fluid overload POA: Yes    Smoking POA: Yes  Resolved Problems:    * No resolved hospital problems. *      FOLLOW UP  - Call your Nephrologist and your PCP in [place] Monday morning to make a follow-up appointment to be seen within the next week.    MEDICATIONS ON DISCHARGE     Medication List      CONTINUE taking these medications      Instructions   furosemide 80 MG  Tabs  Commonly known as:  LASIX   Take 1 Tab by mouth every morning.  Dose:  80 mg     lisinopril 20 MG Tabs  Commonly known as:  PRINIVIL   Take 1 Tab by mouth every morning.  Dose:  20 mg     predniSONE 20 MG Tabs  Commonly known as:  DELTASONE   Take 4 Tabs by mouth every morning.  Dose:  80 mg          DIET  Renal Diet as tolerated.    ACTIVITY  As tolerated.  Weight bearing as tolerated    CONSULTATIONS  NA    PROCEDURES  NA    LABORATORY  Lab Results   Component Value Date/Time    SODIUM 134 (L) 04/21/2018 04:45 AM    POTASSIUM 4.1 04/21/2018 04:45 AM    CHLORIDE 109 04/21/2018 04:45 AM    CO2 17 (L) 04/21/2018 04:45 AM    GLUCOSE 160 (H) 04/21/2018 04:45 AM    BUN 30 (H) 04/21/2018 04:45 AM    CREATININE 1.88 (H) 04/21/2018 04:45 AM        Lab Results   Component Value Date/Time    WBC 9.5 04/21/2018 04:45 AM    HEMOGLOBIN 10.9 (L) 04/21/2018 04:45 AM    HEMATOCRIT 33.9 (L) 04/21/2018 04:45 AM    PLATELETCT 432 04/21/2018 04:45 AM        Total time of the discharge process exceeds 32 minutes   AGUSTO Brandt.

## 2018-04-21 NOTE — DISCHARGE PLANNING
Care Transition Team Assessment    Information Source  Orientation : Oriented x 4  Information Given By: Patient  Who is responsible for making decisions for patient? : Patient         Elopement Risk  Legal Hold: No  Ambulatory or Self Mobile in Wheelchair: Yes  Disoriented: No  Psychiatric Symptoms: None  History of Wandering: No  Elopement this Admit: No  Vocalizing Wanting to Leave: No  Displays Behaviors, Body Language Wanting to Leave: No-Not at Risk for Elopement  Elopement Risk: Not at Risk for Elopement    Interdisciplinary Discharge Planning  Does Admitting Nurse Feel This Could be a Complex Discharge?: No  Primary Care Physician: none  Lives with - Patient's Self Care Capacity: Parents (mom)  Support Systems: Parent (pt from [place]/staying with mom for a month)  Housing / Facility: [address] [place] [address]  Do You Take your Prescribed Medications Regularly: No  Able to Return to Previous ADL's: Yes  Mobility Issues: No  Prior Services: None  Patient Expects to be Discharged to:: home  Assistance Needed: No  Durable Medical Equipment: Home Oxygen              Finances  Financial Barriers to Discharge:  (pt reports insurance through a [employer] product.)  Prescription Coverage: Yes (in [place]./[facility] on [address] [place]/prednisone,lasix,lisinopr)    Vision / Hearing Impairment  Vision Impairment : Yes  Right Eye Vision: Impaired, Wears Glasses  Left Eye Vision: Impaired, Wears Glasses  Hearing Impairment : Yes  Hearing Impairment: Right Ear, Hearing Device Not Available    Values / Beliefs / Concerns  Values / Beliefs Concerns : No         Domestic Abuse  Have you ever been the victim of abuse or violence?: No  Physical Abuse or Sexual Abuse: No  Verbal Abuse or Emotional Abuse: No  Possible Abuse Reported to:: Not Applicable         Discharge Risks or Barriers  Discharge risks or barriers?: No PCP    Anticipated Discharge Information  Anticipated discharge disposition: Home  Discharge Contact Phone Number: pt will  call mom  on dc

## 2018-04-21 NOTE — ASSESSMENT & PLAN NOTE
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  Start patient on Lasix 40 mg twice a day. IV.  Patient said he already had a nephrologist in other stay, patient planned to resume his patient's care with the nephrologist.  We'll close monitor patient here in the hospital. Once patient's edema improved patient likely can be discharged home.  Patient currently also showing signs of hypoalbuminemia, very likely to have significant proteinuria.

## 2018-04-21 NOTE — DISCHARGE PLANNING
Pt asking for a taxi voucher.     CM met with pt who explains he is from [place] and his mom does not drive due to hemiparalysis so he needs assistance with ride home. Pt states if he pays for a cab he won't have enough money to fill his medications. He was informed that with [employer] insurance he would need to fill Rx in [place] and he states he will just buy his meds in [place]. Per [employer] website Lasix, Lisinopril and prednisone are all on the $4 list.    Pt was issued a taxi voucher and notified this is a one time courtesy.    Voucher for:  [id number]  [place], [place]

## 2018-04-21 NOTE — ASSESSMENT & PLAN NOTE
CR 1.78  Patient does have history of nephrotic syndrome  No baseline to compare, likely close to baseline  Patient is overloaded, we'll restart patient with patient's prednisone 80 mg every day and lisinopril 20 mg every day.  Start patient on Lasix 40 mg twice a day. IV.  Patient said he already had a nephrologist in other stay, patient planned to resume his patient's care with the nephrologist.  We'll close monitor patient here in the hospital. Once patient's edema improved patient likely can be discharged home.

## 2018-04-21 NOTE — PROGRESS NOTES
Pt sleeping,easily arousal,BP high,poc explained,no c/o pain at this time,with generalized edema,pt ambulatses.

## 2018-04-21 NOTE — PROGRESS NOTES
A/o, respirations are even and unlabored ,assessment completed, abdomen dist vital signs stable except for elevated /97, medicated per MAR, abdomen rounded and distended, upper and lower extremities edema observed, leg and abdominal pain per pt complaints- medicated, denies any nausea and sob at the moment updated communication board,  poc discussed and understood, verbalized understanding, box meal given, all questions answered at this time , fall precautions in place, call button within reach, will continue to monitor

## 2018-04-21 NOTE — ASSESSMENT & PLAN NOTE
From nephrotic syndrome, fluid retention because of hypoalbuminemia  Start patient on Lasix 40 mg IV twice a day. Close monitoring electrolytes.

## 2018-04-21 NOTE — H&P
Hospital Medicine History and Physical    Date of Service  4/20/2018    Chief Complaint  Chief Complaint   Patient presents with   • Congestive Heart Failure   • Peripheral Edema   • Abdominal Swelling   • Hypertension       History of Presenting Illness  43 y.o. male with a past medical history of hypertension, nephrotic syndrome chronically on prednisone presented 4/20/2018 with complaint of fluid overload. Apparently patient had nephrologist as outpatient [place] state. Patient said he has been on lisinopril 20 mg once a day, prednisone 80 mg once a day. Patient also has been on Lasix 40 mg twice a day. Patient said he ran out of medication a week ago. Patient is visiting  his mother locally and he plans to go back to his original state later on. Patient complains of lower extremity pain because of the edema. Patient's pain is local, 6-8/10, intermittent and does not radiate to other location, sharp and with some tingling.  Patient otherwise denies fever, chills, nausea, vomiting, adb pain, SOB, CP, headache, constipation, diarrhea, cough, or sputum.      Primary Care Physician  No primary care provider on file.    Consultants  none    Code Status  Code: Full code    Review of Systems  Review of Systems   Constitutional: Negative for chills, fever and weight loss.   HENT: Negative for congestion, ear discharge, ear pain, hearing loss and nosebleeds.    Eyes: Negative for blurred vision and pain.   Respiratory: Negative for cough, sputum production, shortness of breath and wheezing.    Cardiovascular: Positive for leg swelling. Negative for chest pain, palpitations and PND.   Gastrointestinal: Negative for abdominal pain, constipation, diarrhea, heartburn, nausea and vomiting.   Genitourinary: Negative for dysuria, frequency and hematuria.   Musculoskeletal: Negative for back pain, falls, joint pain and neck pain.   Skin: Negative for rash.   Neurological: Negative for dizziness, tremors, speech change, seizures,  "weakness and headaches.   Psychiatric/Behavioral: Negative for depression, substance abuse and suicidal ideas.      Past Medical History  Nephrotic syndrome, minimal change disease.  Hypertension essential    Surgical History  Patient denies major surgery history    Medications  Prednisone 80 mg  Lasix 80 mg  Lisinopril 20 mg    Family History  No family history on file.  reviewed and felt non pertinent to this encounter     Social History  Social History   Substance Use Topics   • Smoking status: Current Every Day Smoker   • Smokeless tobacco: Not on file   • Alcohol use Yes       Allergies  No Known Allergies     Physical Exam  Laboratory   Vitals/ General Appearance:   Weight/BMI: Body mass index is 29.76 kg/m².  Blood pressure (!) 186/121, pulse 86, temperature 36.8 °C (98.2 °F), resp. rate 16, height 1.854 m (6' 1\"), weight 102.3 kg (225 lb 8.5 oz), SpO2 97 %.   Vitals:    04/20/18 1700 04/20/18 1730 04/20/18 1830 04/20/18 1900   BP:       Pulse:       Resp: 18 18 16 16   Temp:       SpO2: 97% 95% 97% 97%   Weight:       Height:        Oxygen Therapy:  Pulse Oximetry: 97 %, O2 Delivery: None (Room Air)    Constitutional:  well developed, well nourished, non-toxic, no acute distress  HENMT: Normocephalic, atraumatic, b/l ears normal, nose normal  Eyes:  EOMI, conjunctiva normal, no discharge  Neck: no tracheal deviation, supple  Cardiovascular: normal heart rate, normal rhythm, no murmurs, no rubs or gallops; no cyanosis, clubbing or edema  Lungs: Respiratory effort is normal, normal breath sounds, breath sounds clear to auscultation b/l, no rales, rhonchi or wheezing  Abdomen: soft, non-tender, no guarding or rebound, active BS, no mass  Skin: warm, dry, no erythema, no rash  Neurologic: Alert and oriented, strength 5/5, no focal deficits, CN II-XII normal  Psychiatric: No anxiety or depression  Lymph node: No lymphadenopathy appreciated in the neck groin and axillary area.   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  Start patient on Lasix 40 mg twice a day. IV.  Patient said he already had a nephrologist in other stay, patient planned to resume his patient's care with the nephrologist.  We'll close monitor patient here in the hospital. Once patient's edema improved patient likely can be discharged home.  Patient currently also showing signs of hypoalbuminemia, very likely to have significant proteinuria.        Smoking- (present on admission)   Assessment & Plan    - Smoking cessation education provided  - Nicotine patch          Fluid overload- (present on admission)   Assessment & Plan    From nephrotic syndrome, fluid retention because of hypoalbuminemia  Start patient on Lasix 40 mg IV twice a day. Close monitoring electrolytes.        Hypoalbuminemia- (present on admission)   Assessment & Plan    From nephrotic syndrome, currently stable.  Restart patient prednisone by mouth.        CHELSEA (acute kidney injury) (CMS-HCC)- (present on admission)   Assessment & Plan    CR 1.78  Patient does have history of nephrotic syndrome  No baseline to compare, likely close to baseline  Patient is overloaded, we'll restart patient with patient's prednisone 80 mg every day and lisinopril 20 mg every day.  Start patient on Lasix 40 mg twice a day. IV.  Patient said he already had a nephrologist in other stay, patient planned to resume his patient's care with the nephrologist.  We'll close monitor patient here  in the hospital. Once patient's edema improved patient likely can be discharged home.              I anticipate this patient is appropriate for observation status at this time.    Prophylaxis: sc heparin    Recent Labs      04/20/18   1448   WBC  9.2   RBC  3.73*   HEMOGLOBIN  10.4*   HEMATOCRIT  31.6*   MCV  84.7   MCH  27.9   MCHC  32.9*   RDW  45.1   PLATELETCT  437   MPV  9.2     Recent Labs      04/20/18   1448   SODIUM  135   POTASSIUM  3.9   CHLORIDE  108   CO2  20   GLUCOSE  93   BUN  26*   CREATININE  1.78*   CALCIUM  7.4*     Recent Labs      04/20/18   1448   ALTSGPT  8   ASTSGOT  16   ALKPHOSPHAT  197*   TBILIRUBIN  0.1   LIPASE  12   GLUCOSE  93     Recent Labs      04/20/18   1448   APTT  35.7   INR  1.07     Recent Labs      04/20/18   1448   BNPBTYPENAT  156*         Lab Results   Component Value Date    TROPONINI 0.01 04/20/2018       Imaging  DX-CHEST-LIMITED (1 VIEW)   Final Result      No acute cardiopulmonary disease.             I have discussed patient admission status with  in the ER.    I evaluated the patient, reviewing the chart, vitals, labs and imaging, discussing the case with ED physician, medication reconciliation, placing orders and enacting the plan above.    Spent 11 minutes on tobacco cessation counseling including nicotine patches, gum, and dangers of smoking.    The pt indicated that will quit.     [id number] (smoking and tobacco cessation counseling visit; intensive, greater than 10 minutes).

## 2018-04-22 ENCOUNTER — APPOINTMENT (OUTPATIENT)
Dept: RADIOLOGY | Facility: MEDICAL CENTER | Age: 43
DRG: 871 | End: 2018-04-22
Attending: HOSPITALIST
Payer: COMMERCIAL

## 2018-04-22 ENCOUNTER — PATIENT OUTREACH (OUTPATIENT)
Dept: HEALTH INFORMATION MANAGEMENT | Facility: OTHER | Age: 43
End: 2018-04-22

## 2018-04-22 ENCOUNTER — RESOLUTE PROFESSIONAL BILLING HOSPITAL PROF FEE (OUTPATIENT)
Dept: HOSPITALIST | Facility: MEDICAL CENTER | Age: 43
End: 2018-04-22
Payer: COMMERCIAL

## 2018-04-22 PROBLEM — A41.9 SEPSIS (HCC): Status: ACTIVE | Noted: 2018-04-22

## 2018-04-22 PROBLEM — I10 UNCONTROLLED HYPERTENSION: Status: ACTIVE | Noted: 2018-04-22

## 2018-04-22 PROBLEM — R60.1 ANASARCA: Status: ACTIVE | Noted: 2018-04-22

## 2018-04-22 PROBLEM — B18.2 CHRONIC HEPATITIS C WITHOUT HEPATIC COMA (HCC): Status: ACTIVE | Noted: 2018-04-22

## 2018-04-22 PROBLEM — K70.11 ALCOHOLIC HEPATITIS WITH ASCITES: Status: ACTIVE | Noted: 2018-04-22

## 2018-04-22 PROBLEM — F15.10 METHAMPHETAMINE ABUSE (HCC): Status: ACTIVE | Noted: 2018-04-22

## 2018-04-22 PROBLEM — K65.2 SBP (SPONTANEOUS BACTERIAL PERITONITIS) (HCC): Status: ACTIVE | Noted: 2018-04-22

## 2018-04-22 LAB
ALBUMIN SERPL BCP-MCNC: <1.5 G/DL (ref 3.2–4.9)
AMPHET UR QL SCN: POSITIVE
APPEARANCE FLD: NORMAL
BARBITURATES UR QL SCN: NEGATIVE
BENZODIAZ UR QL SCN: NEGATIVE
BODY FLD TYPE: NORMAL
BODY FLD TYPE: NORMAL
BZE UR QL SCN: NEGATIVE
CANNABINOIDS UR QL SCN: POSITIVE
COLOR FLD: NORMAL
CSF COMMENTS 1658: NORMAL
EKG IMPRESSION: NORMAL
EST. AVERAGE GLUCOSE BLD GHB EST-MCNC: 108 MG/DL
ETHANOL BLD-MCNC: 0.01 G/DL
GRAM STN SPEC: NORMAL
HBA1C MFR BLD: 5.4 % (ref 0–5.6)
LACTATE BLD-SCNC: 1.1 MMOL/L (ref 0.5–2)
LACTATE BLD-SCNC: 1.7 MMOL/L (ref 0.5–2)
LV EJECT FRACT  99904: 65
LV EJECT FRACT MOD 2C 99903: 62.45
LV EJECT FRACT MOD 4C 99902: 51.37
LV EJECT FRACT MOD BP 99901: 58.83
LYMPHOCYTES NFR FLD: 18 %
METHADONE UR QL SCN: NEGATIVE
MONONUC CELLS NFR FLD: 1 %
NEUTROPHILS NFR FLD: 81 %
OPIATES UR QL SCN: NEGATIVE
OXYCODONE UR QL SCN: NEGATIVE
PCP UR QL SCN: NEGATIVE
PROPOXYPH UR QL SCN: NEGATIVE
PROT FLD-MCNC: <1 G/DL
RBC # FLD: <2000 CELLS/UL
SIGNIFICANT IND 70042: NORMAL
SITE SITE: NORMAL
SOURCE SOURCE: NORMAL
WBC # FLD: 736 CELLS/UL

## 2018-04-22 PROCEDURE — 700102 HCHG RX REV CODE 250 W/ 637 OVERRIDE(OP): Performed by: HOSPITALIST

## 2018-04-22 PROCEDURE — 93306 TTE W/DOPPLER COMPLETE: CPT | Mod: 26 | Performed by: INTERNAL MEDICINE

## 2018-04-22 PROCEDURE — 89051 BODY FLUID CELL COUNT: CPT

## 2018-04-22 PROCEDURE — 36415 COLL VENOUS BLD VENIPUNCTURE: CPT

## 2018-04-22 PROCEDURE — 96374 THER/PROPH/DIAG INJ IV PUSH: CPT

## 2018-04-22 PROCEDURE — 87205 SMEAR GRAM STAIN: CPT

## 2018-04-22 PROCEDURE — 99223 1ST HOSP IP/OBS HIGH 75: CPT | Mod: 25 | Performed by: HOSPITALIST

## 2018-04-22 PROCEDURE — 99233 SBSQ HOSP IP/OBS HIGH 50: CPT | Performed by: HOSPITALIST

## 2018-04-22 PROCEDURE — 87040 BLOOD CULTURE FOR BACTERIA: CPT

## 2018-04-22 PROCEDURE — 700101 HCHG RX REV CODE 250: Performed by: HOSPITALIST

## 2018-04-22 PROCEDURE — A9270 NON-COVERED ITEM OR SERVICE: HCPCS | Performed by: HOSPITALIST

## 2018-04-22 PROCEDURE — 49083 ABD PARACENTESIS W/IMAGING: CPT | Performed by: HOSPITALIST

## 2018-04-22 PROCEDURE — 96375 TX/PRO/DX INJ NEW DRUG ADDON: CPT

## 2018-04-22 PROCEDURE — 700111 HCHG RX REV CODE 636 W/ 250 OVERRIDE (IP): Performed by: HOSPITALIST

## 2018-04-22 PROCEDURE — 87070 CULTURE OTHR SPECIMN AEROBIC: CPT

## 2018-04-22 PROCEDURE — 84157 ASSAY OF PROTEIN OTHER: CPT

## 2018-04-22 PROCEDURE — 770020 HCHG ROOM/CARE - TELE (206)

## 2018-04-22 PROCEDURE — 49082 ABD PARACENTESIS: CPT

## 2018-04-22 PROCEDURE — 93306 TTE W/DOPPLER COMPLETE: CPT

## 2018-04-22 PROCEDURE — 83605 ASSAY OF LACTIC ACID: CPT

## 2018-04-22 PROCEDURE — 0W9G3ZX DRAINAGE OF PERITONEAL CAVITY, PERCUTANEOUS APPROACH, DIAGNOSTIC: ICD-10-PCS | Performed by: HOSPITALIST

## 2018-04-22 PROCEDURE — 80307 DRUG TEST PRSMV CHEM ANLYZR: CPT

## 2018-04-22 PROCEDURE — 82040 ASSAY OF SERUM ALBUMIN: CPT

## 2018-04-22 RX ORDER — BISACODYL 10 MG
10 SUPPOSITORY, RECTAL RECTAL
Status: DISCONTINUED | OUTPATIENT
Start: 2018-04-22 | End: 2018-04-24 | Stop reason: HOSPADM

## 2018-04-22 RX ORDER — HYDRALAZINE HYDROCHLORIDE 20 MG/ML
10 INJECTION INTRAMUSCULAR; INTRAVENOUS EVERY 6 HOURS PRN
Status: DISCONTINUED | OUTPATIENT
Start: 2018-04-22 | End: 2018-04-24 | Stop reason: HOSPADM

## 2018-04-22 RX ORDER — PROMETHAZINE HYDROCHLORIDE 25 MG/1
12.5-25 TABLET ORAL EVERY 4 HOURS PRN
Status: DISCONTINUED | OUTPATIENT
Start: 2018-04-22 | End: 2018-04-24 | Stop reason: HOSPADM

## 2018-04-22 RX ORDER — CLONIDINE HYDROCHLORIDE 0.1 MG/1
0.1 TABLET ORAL EVERY 6 HOURS PRN
Status: DISCONTINUED | OUTPATIENT
Start: 2018-04-22 | End: 2018-04-24 | Stop reason: HOSPADM

## 2018-04-22 RX ORDER — METOPROLOL TARTRATE 50 MG/1
50 TABLET, FILM COATED ORAL TWICE DAILY
Status: DISCONTINUED | OUTPATIENT
Start: 2018-04-22 | End: 2018-04-23

## 2018-04-22 RX ORDER — POLYETHYLENE GLYCOL 3350 17 G/17G
1 POWDER, FOR SOLUTION ORAL
Status: DISCONTINUED | OUTPATIENT
Start: 2018-04-22 | End: 2018-04-24 | Stop reason: HOSPADM

## 2018-04-22 RX ORDER — ACETAMINOPHEN 325 MG/1
650 TABLET ORAL EVERY 6 HOURS PRN
Status: DISCONTINUED | OUTPATIENT
Start: 2018-04-22 | End: 2018-04-24 | Stop reason: HOSPADM

## 2018-04-22 RX ORDER — LISINOPRIL 20 MG/1
20 TABLET ORAL EVERY MORNING
Status: DISCONTINUED | OUTPATIENT
Start: 2018-04-22 | End: 2018-04-23

## 2018-04-22 RX ORDER — AMOXICILLIN 250 MG
2 CAPSULE ORAL 2 TIMES DAILY
Status: DISCONTINUED | OUTPATIENT
Start: 2018-04-22 | End: 2018-04-24 | Stop reason: HOSPADM

## 2018-04-22 RX ORDER — ZOLPIDEM TARTRATE 5 MG/1
5 TABLET ORAL
Status: DISCONTINUED | OUTPATIENT
Start: 2018-04-22 | End: 2018-04-24 | Stop reason: HOSPADM

## 2018-04-22 RX ORDER — MORPHINE SULFATE 4 MG/ML
2 INJECTION, SOLUTION INTRAMUSCULAR; INTRAVENOUS EVERY 4 HOURS PRN
Status: DISCONTINUED | OUTPATIENT
Start: 2018-04-22 | End: 2018-04-23

## 2018-04-22 RX ORDER — LABETALOL HYDROCHLORIDE 5 MG/ML
10 INJECTION, SOLUTION INTRAVENOUS EVERY 6 HOURS PRN
Status: DISCONTINUED | OUTPATIENT
Start: 2018-04-22 | End: 2018-04-24 | Stop reason: HOSPADM

## 2018-04-22 RX ORDER — PREDNISONE 20 MG/1
80 TABLET ORAL EVERY MORNING
Status: DISCONTINUED | OUTPATIENT
Start: 2018-04-22 | End: 2018-04-24 | Stop reason: HOSPADM

## 2018-04-22 RX ORDER — ONDANSETRON 2 MG/ML
4 INJECTION INTRAMUSCULAR; INTRAVENOUS EVERY 4 HOURS PRN
Status: DISCONTINUED | OUTPATIENT
Start: 2018-04-22 | End: 2018-04-24 | Stop reason: HOSPADM

## 2018-04-22 RX ORDER — FUROSEMIDE 40 MG/1
80 TABLET ORAL EVERY MORNING
Status: DISCONTINUED | OUTPATIENT
Start: 2018-04-22 | End: 2018-04-24 | Stop reason: HOSPADM

## 2018-04-22 RX ORDER — ONDANSETRON 4 MG/1
4 TABLET, ORALLY DISINTEGRATING ORAL EVERY 4 HOURS PRN
Status: DISCONTINUED | OUTPATIENT
Start: 2018-04-22 | End: 2018-04-24 | Stop reason: HOSPADM

## 2018-04-22 RX ORDER — SODIUM CHLORIDE 9 MG/ML
500 INJECTION, SOLUTION INTRAVENOUS
Status: DISCONTINUED | OUTPATIENT
Start: 2018-04-22 | End: 2018-04-24 | Stop reason: HOSPADM

## 2018-04-22 RX ORDER — PROMETHAZINE HYDROCHLORIDE 25 MG/1
12.5-25 SUPPOSITORY RECTAL EVERY 4 HOURS PRN
Status: DISCONTINUED | OUTPATIENT
Start: 2018-04-22 | End: 2018-04-24 | Stop reason: HOSPADM

## 2018-04-22 RX ORDER — HEPARIN SODIUM 5000 [USP'U]/ML
5000 INJECTION, SOLUTION INTRAVENOUS; SUBCUTANEOUS EVERY 8 HOURS
Status: DISCONTINUED | OUTPATIENT
Start: 2018-04-22 | End: 2018-04-22

## 2018-04-22 RX ADMIN — CEFTRIAXONE 2 G: 2 INJECTION, POWDER, FOR SOLUTION INTRAMUSCULAR; INTRAVENOUS at 03:37

## 2018-04-22 RX ADMIN — MORPHINE SULFATE 2 MG: 4 INJECTION INTRAVENOUS at 01:27

## 2018-04-22 RX ADMIN — MORPHINE SULFATE 2 MG: 4 INJECTION INTRAVENOUS at 09:38

## 2018-04-22 RX ADMIN — FUROSEMIDE 80 MG: 40 TABLET ORAL at 09:38

## 2018-04-22 RX ADMIN — MORPHINE SULFATE 2 MG: 4 INJECTION INTRAVENOUS at 05:50

## 2018-04-22 RX ADMIN — HYDRALAZINE HYDROCHLORIDE 10 MG: 20 INJECTION INTRAMUSCULAR; INTRAVENOUS at 22:24

## 2018-04-22 RX ADMIN — LABETALOL HYDROCHLORIDE 10 MG: 5 INJECTION, SOLUTION INTRAVENOUS at 06:46

## 2018-04-22 RX ADMIN — LISINOPRIL 20 MG: 20 TABLET ORAL at 09:38

## 2018-04-22 RX ADMIN — METOPROLOL TARTRATE 50 MG: 50 TABLET, FILM COATED ORAL at 20:28

## 2018-04-22 RX ADMIN — HYDRALAZINE HYDROCHLORIDE 10 MG: 20 INJECTION INTRAMUSCULAR; INTRAVENOUS at 09:38

## 2018-04-22 RX ADMIN — MORPHINE SULFATE 2 MG: 4 INJECTION INTRAVENOUS at 18:07

## 2018-04-22 RX ADMIN — MORPHINE SULFATE 2 MG: 4 INJECTION INTRAVENOUS at 22:24

## 2018-04-22 RX ADMIN — PREDNISONE 80 MG: 20 TABLET ORAL at 09:38

## 2018-04-22 RX ADMIN — CLONIDINE HYDROCHLORIDE 0.1 MG: 0.1 TABLET ORAL at 04:29

## 2018-04-22 RX ADMIN — METOPROLOL TARTRATE 50 MG: 50 TABLET, FILM COATED ORAL at 12:49

## 2018-04-22 ASSESSMENT — COGNITIVE AND FUNCTIONAL STATUS - GENERAL
SUGGESTED CMS G CODE MODIFIER DAILY ACTIVITY: CH
CLIMB 3 TO 5 STEPS WITH RAILING: A LITTLE
MOBILITY SCORE: 23
SUGGESTED CMS G CODE MODIFIER MOBILITY: CI
DAILY ACTIVITIY SCORE: 24

## 2018-04-22 ASSESSMENT — ENCOUNTER SYMPTOMS
CLAUDICATION: 0
BACK PAIN: 0
DIZZINESS: 0
CARDIOVASCULAR NEGATIVE: 1
EYES NEGATIVE: 1
DIARRHEA: 0
HEADACHES: 0
NECK PAIN: 0
HEMOPTYSIS: 0
PALPITATIONS: 0
NAUSEA: 0
SHORTNESS OF BREATH: 1
FEVER: 0
FOCAL WEAKNESS: 0
PSYCHIATRIC NEGATIVE: 1
SORE THROAT: 0
CONSTIPATION: 0
NEUROLOGICAL NEGATIVE: 1
MYALGIAS: 0
EYE DISCHARGE: 0
VOMITING: 0
SENSORY CHANGE: 0
BRUISES/BLEEDS EASILY: 0
EYE PAIN: 0
CHILLS: 1
WEAKNESS: 0
MUSCULOSKELETAL NEGATIVE: 1
SPUTUM PRODUCTION: 0
WHEEZING: 0
LOSS OF CONSCIOUSNESS: 0
DEPRESSION: 0
DIAPHORESIS: 0
ABDOMINAL PAIN: 1
SPEECH CHANGE: 0
COUGH: 0
FEVER: 1
ABDOMINAL PAIN: 0

## 2018-04-22 ASSESSMENT — PAIN SCALES - GENERAL
PAINLEVEL_OUTOF10: 6
PAINLEVEL_OUTOF10: 10
PAINLEVEL_OUTOF10: 9
PAINLEVEL_OUTOF10: 8
PAINLEVEL_OUTOF10: 10
PAINLEVEL_OUTOF10: 10
PAINLEVEL_OUTOF10: 8

## 2018-04-22 ASSESSMENT — LIFESTYLE VARIABLES
EVER_SMOKED: YES
ALCOHOL_USE: NO
SUBSTANCE_ABUSE: 1

## 2018-04-22 ASSESSMENT — PATIENT HEALTH QUESTIONNAIRE - PHQ9
1. LITTLE INTEREST OR PLEASURE IN DOING THINGS: NOT AT ALL
SUM OF ALL RESPONSES TO PHQ9 QUESTIONS 1 AND 2: 0
2. FEELING DOWN, DEPRESSED, IRRITABLE, OR HOPELESS: NOT AT ALL

## 2018-04-22 NOTE — PROCEDURES
PROCEDURE:  Diagnostic paracentesis  INDICATION: Ascites, suspect SBP (diagnostic) Chronic liver disease  PROCEDURE : Dorian Wang   CONSENT: Informed consent was obtained after risks and benefits were explained at length.   PROCEDURE SUMMARY:  A time-out was performed. Ultrasound guidance was used to find a suitable site which was marked. The area of the LEFT RIGHT abdomen was prepped and draped in a sterile fashion using chlorhexidine scrub. 1% lidocaine was used to numb the region. No blood was aspirated. Clear yellow fluid was retrieved and collected. Approximately 65 mL of ascitic fluid was collected and sent for laboratory analysis. The catheter was then connected to the vaccutainer and .25 liters of additional ascitic fluid were drained. The catheter was removed and no leaking was noted. The patient tolerated the procedure well without any immediate complications.     ESTIMATED BLOOD LOSS: 5ml  COMPLICATIONS: None

## 2018-04-22 NOTE — ASSESSMENT & PLAN NOTE
This is severe sepsis with the following associated acute organ dysfunction(s): acute kidney failure.  Likely due to SBP   4/23:  Resolving.  BC negative, PF pending.

## 2018-04-22 NOTE — ED PROVIDER NOTES
ED Provider Note    Scribed for Jasmin Jean M.D. by Efrain Arnold. 4/21/2018, 9:48 PM.    Means of arrival: EMS  History obtained from: Patient  History limited by: None    CHIEF COMPLAINT  Chief Complaint   Patient presents with   • Shortness of Breath     Pt discharged yesterday, pt was here for renal issues/SOB. Received lasix and prednisone, discharged with scripts and has been taking. States today SOB and leg swelling has worsened.        HPI  Td Watt is a 43 y.o. Male, with a history of liver cirrhosis and renal failure, who presents to the Emergency Department for shortness of breath and leg swelling onset yesterday. His shortness of breath is worsened by sitting up with no alleviating factors. He also notes extensive fluid collection in his abdomen secondary to his cirrhosis, and his last paracentesis was 18 days ago in [place].   Patient was seen in the ED yesterday and had the choice of discharge or continuing evaluation from a nephrologist, and he decided to leave as his mother is in town. He was discharged with prescriptions for Lasix and Prednisone which he has been compliant with. He also just recently started taking his kidney medication again with his most recent dose earlier today.  Patient is not experiencing nausea.    REVIEW OF SYSTEMS  Pertinent positives include shortness of breath, leg swelling, abdominal fluid collection. Pertinent negatives include no nausea.  All other systems reviewed and negative. C.    PAST MEDICAL HISTORY   has a past medical history of Congestive heart failure (CMS-HCC); Hepatitis C; Hypertension; and Liver cirrhosis (CMS-HCC).    SURGICAL HISTORY  patient denies any surgical history    SOCIAL HISTORY  Social History   Substance Use Topics   • Smoking status: Current Every Day Smoker   • Smokeless tobacco: Never Used   • Alcohol use Yes      History   Drug Use     Comment: marijuana       FAMILY HISTORY  History reviewed. No pertinent family  "history.    CURRENT MEDICATIONS  Home Medications     Reviewed by Karlos Kennedy R.N. (Registered Nurse) on 04/21/18 at 2051  Med List Status: Complete   Medication Last Dose Status   furosemide (LASIX) 80 MG Tab 4/21/2018 Active   lisinopril (PRINIVIL) 20 MG Tab 4/21/2018 Active   predniSONE (DELTASONE) 20 MG Tab 4/21/2018 Active                ALLERGIES  No Known Allergies    PHYSICAL EXAM  VITAL SIGNS: BP (!) 176/126   Pulse (!) 114   Temp 36.4 °C (97.6 °F)   Resp 15   Ht 1.854 m (6' 1\")   Wt 109.2 kg (240 lb 11.9 oz)   SpO2 97%   BMI 31.76 kg/m²   Constitutional: Alert in no apparent distress.  HENT: No signs of trauma, Bilateral external ears normal, Nose normal.   Eyes: Pupils are equal and reactive, Conjunctiva normal, Non-icteric.   Neck: Normal range of motion, No tenderness, Supple, No stridor.   Cardiovascular: Regular rate and rhythm, no murmurs.   Thorax & Lungs: Speaking in short sentences, Rhales in right lower lobes, No respiratory distress, No wheezing, No chest tenderness.   Abdomen: Bowel sounds normal, No tenderness, No masses, No peritoneal signs. Distended with fluid.  Skin: Warm, Dry, No erythema, No rash.   Musculoskeletal:  Tense edema of bilateral lower extremities.   Neurologic: Alert, moving all extremities without difficulty, no focal deficits.    LABS  Labs Reviewed   CBC WITH DIFFERENTIAL - Abnormal; Notable for the following:        Result Value    WBC 24.4 (*)     RBC 3.94 (*)     Hemoglobin 10.9 (*)     Hematocrit 32.9 (*)     MCHC 33.1 (*)     Platelet Count 589 (*)     Neutrophils-Polys 86.10 (*)     Lymphocytes 10.10 (*)     Neutrophils (Absolute) 20.97 (*)     Immature Granulocytes (abs) 0.18 (*)     All other components within normal limits   COMP METABOLIC PANEL - Abnormal; Notable for the following:     Co2 16 (*)     Glucose 125 (*)     Bun 37 (*)     Creatinine 2.67 (*)     Calcium 7.5 (*)     Alkaline Phosphatase 198 (*)     Albumin 1.9 (*)     Total Protein 5.9 (*)  "    Globulin 4.0 (*)     All other components within normal limits   BTYPE NATRIURETIC PEPTIDE - Abnormal; Notable for the following:     B Natriuretic Peptide 123 (*)     All other components within normal limits   ESTIMATED GFR - Abnormal; Notable for the following:     GFR If  32 (*)     GFR If Non  26 (*)     All other components within normal limits   URINE DRUG SCREEN     All labs reviewed by me.    EKG  12 Lead EKG interpreted by me to show:  Sinus tachycardia     Rate 120  Axis: Normal  Intervals: Normal  Normal T waves  Normal ST segments  My impression of this EKG: Sinus tachycardia without ischemia    RADIOLOGY  DX-CHEST-PORTABLE (1 VIEW)   Final Result      No acute cardiac or pulmonary abnormalities are identified.        The radiologist's interpretation of all radiological studies have been reviewed by me.    COURSE & MEDICAL DECISION MAKING  Pertinent Labs & Imaging studies reviewed. (See chart for details)    Differential diagnoses include but are not limited to: fluid overload, renal failure    9:48 PM - Patient seen and examined at bedside. Informed him that he will likely require admission for continued treatment. Patient will be treated with lisinopril 10 mg PO. Ordered DX chest, drug screen CBC, CMP, BNP, and EKG to evaluate his symptoms.     11:22 PM labs are indicative of worsening renal failure creatinine of 2.6. He does have an elevated with blood cell count this may be secondary to deep margination and recent steroid use. Do think your will require admission for fluid diuresis. Consulted with Dr. Wang, hospitalist, regarding the above case findings. He agrees to admit.      DISPOSITION:  Patient will be admitted to Dr. Wang in guarded condition.     FINAL IMPRESSION  1. CHELSEA (acute kidney injury) (CMS-HCC)    2. Hypervolemia, unspecified hypervolemia type         This dictation has been created using voice recognition software and/or scribes. The accuracy of  the dictation is limited by the abilities of the software and the expertise of the scribes. I expect there may be some errors of grammar and possibly content. I made every attempt to manually correct the errors within my dictation. However, errors related to voice recognition software and/or scribes may still exist and should be interpreted within the appropriate context.     IEfrain (Scribe), am scribing for, and in the presence of, Jasmin Jean M.D..    Electronically signed by: Efrain Arnold (Scribe), 4/21/2018    I, Jasmin Jean M.D. personally performed the services described in this documentation, as scribed by Efrain Arnold in my presence, and it is both accurate and complete.    The note accurately reflects work and decisions made by me.  Jasmin Jean  4/22/2018  12:22 AM

## 2018-04-22 NOTE — PROGRESS NOTES
Assumed care of pt.  Bedside report received and whiteboard updated. Discussed plan of care for the day and answered questions.  Chart and MAR reviewed.  Call light and personal belongings are within reach.  Bed in low position and locked. Pt has no needs at this time.    Dentures: denies  Glasses: at home  Hearing aides: denies

## 2018-04-22 NOTE — PROGRESS NOTES
Chart reviewed.  Pt was discharged from [facility] 4/21/18 and readmitted to [facility] 4/21/18.  No discharge outreach phone call made to pt at this time d/t readmission.

## 2018-04-22 NOTE — ASSESSMENT & PLAN NOTE
- Ascitic fluid cultures remained negative. I will continue him on oral Omnicef to complete 7 days course empirically. Follow-up with PCP.

## 2018-04-22 NOTE — ASSESSMENT & PLAN NOTE
- Stable. He will continue to follow-up with nephrology as outpatient in [place]. I will resume him on his home dose lisinopril. 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

## 2018-04-22 NOTE — PROGRESS NOTES
2 RN skin check completed with Clemente    Incision on left lower abdomen    All other skin is intact with no signs of pressure related breakdown or injury

## 2018-04-22 NOTE — ED TRIAGE NOTES
"Chief Complaint   Patient presents with   • Shortness of Breath     Pt discharged yesterday, pt was here for renal issues/SOB. Received lasix and prednisone, discharged with scripts and has been taking. States today SOB and leg swelling has worsened.      BP (!) 176/126   Pulse (!) 118   Temp 36.4 °C (97.6 °F)   Resp (!) 22   Ht 1.854 m (6' 1\")   Wt 109.2 kg (240 lb 11.9 oz)   SpO2 96%   BMI 31.76 kg/m²     Pt to triage via WC, able to stand for weight. Pitting edema to extremities noted. No signs of respiratory distress but notes increased difficulty breathing. Pt returned to [facility], educated on triage process, instructed to notify staff of worsening symptoms/concerns. Hypertensive. EKG completed in triage.   "

## 2018-04-22 NOTE — CARE PLAN
Problem: Infection  Goal: Will remain free from infection  Outcome: PROGRESSING AS EXPECTED  Pt shows no new or worsening s/s of infection       Problem: Pain Management  Goal: Pain level will decrease to patient's comfort goal  Outcome: PROGRESSING AS EXPECTED  Discussed POC for pain mgmt.  Medicated per MAR &/or practicing non-pharmacological interventions.

## 2018-04-22 NOTE — ED NOTES
Bedside paracentesis completed by hospitalist. approx 200 ML fluid removed during procedure. Pt in nad. Pt provided sandwich box meal per request. Pt updated with plan of care. Pt denies any further needs. Will continue to monitor.

## 2018-04-22 NOTE — H&P
Hospital Medicine History and Physical    Date of Service  4/22/2018    Chief Complaint  Chief Complaint   Patient presents with   • Shortness of Breath     Pt discharged yesterday, pt was here for renal issues/SOB. Received lasix and prednisone, discharged with scripts and has been taking. States today SOB and leg swelling has worsened.        History of Presenting Illness  43 y.o. Male with history of nephrotic syndrome treated in [facility], chronic active hepatitis C, and liver cirrhosis was in his usual state of health until the day prior to admission after being discharged from the hospital.  He reports that he began to have worsening shortness of breath, weakness, which was not improved by rest, and exacerbated by activity, he subsequently re-presented to the emergency department for further evaluation.      Primary Care Physician  No primary care provider on file.    Consultants  none    Code Status  Full code     Review of Systems  Review of Systems   Constitutional: Positive for fever.   HENT: Negative.    Eyes: Negative.    Respiratory: Positive for shortness of breath.    Cardiovascular: Negative.    Gastrointestinal: Positive for abdominal pain.   Genitourinary: Positive for dysuria.   Musculoskeletal: Negative.    Skin: Negative.    Neurological: Negative.    Endo/Heme/Allergies: Negative.    Psychiatric/Behavioral: Negative.         Past Medical History  Past Medical History:   Diagnosis Date   • Congestive heart failure (CMS-HCC)    • Hepatitis C    • Hypertension    • Liver cirrhosis (CMS-HCC)        Surgical History  No past surgical history on file.    Medications  No current facility-administered medications on file prior to encounter.      Current Outpatient Prescriptions on File Prior to Encounter   Medication Sig Dispense Refill   • lisinopril (PRINIVIL) 20 MG Tab Take 1 Tab by mouth every morning. 30 Tab 0   • furosemide (LASIX) 80 MG Tab Take 1 Tab by mouth every morning. 30 Tab 0   •  predniSONE (DELTASONE) 20 MG Tab Take 4 Tabs by mouth every morning. 120 Tab 0       Family History  History reviewed. No pertinent family history.    Social History  Social History   Substance Use Topics   • Smoking status: Current Every Day Smoker   • Smokeless tobacco: Never Used   • Alcohol use Yes       Allergies  No Known Allergies     Physical Exam  Laboratory   Hemodynamics  Temp (24hrs), Av.4 °C (97.6 °F), Min:36.4 °C (97.6 °F), Max:36.4 °C (97.6 °F)   Temperature: 36.4 °C (97.6 °F)  Pulse  Av.5  Min: 102  Max: 120 Heart Rate (Monitored): (!) 109  Blood Pressure: (!) 176/126, NIBP: (!) 167/101      Respiratory      Respiration: 17, Pulse Oximetry: 97 %             Physical Exam   Constitutional: He is oriented to person, place, and time. He appears well-developed and well-nourished. No distress.   HENT:   Head: Normocephalic and atraumatic.   Eyes: Conjunctivae are normal. Pupils are equal, round, and reactive to light.   Neck: Normal range of motion. Neck supple. No tracheal deviation present. No thyromegaly present.   Cardiovascular: Normal rate, regular rhythm and normal heart sounds.  Exam reveals no gallop and no friction rub.    No murmur heard.  Pulmonary/Chest: Effort normal and breath sounds normal. No respiratory distress. He has no wheezes.   Abdominal: Soft. Bowel sounds are normal. He exhibits distension. He exhibits no mass. There is tenderness. There is no rebound and no guarding.   Musculoskeletal: Normal range of motion. He exhibits no edema.   Lymphadenopathy:     He has no cervical adenopathy.   Neurological: He is alert and oriented to person, place, and time. No cranial nerve deficit.   Skin: Skin is warm and dry. He is not diaphoretic.   Psychiatric: He has a normal mood and affect.   Nursing note and vitals reviewed.      Recent Labs      18   1448  18   0445  18   2205   WBC  9.2  9.5  24.4*   RBC  3.73*  4.00*  3.94*   HEMOGLOBIN  10.4*  10.9*  10.9*    HEMATOCRIT  31.6*  33.9*  32.9*   MCV  84.7  84.8  83.5   MCH  27.9  27.3  27.7   MCHC  32.9*  32.2*  33.1*   RDW  45.1  44.1  43.8   PLATELETCT  437  432  589*   MPV  9.2  9.0  9.2     Recent Labs      04/20/18   1448  04/21/18   0445  04/21/18   2205   SODIUM  135  134*  137   POTASSIUM  3.9  4.1  4.4   CHLORIDE  108  109  111   CO2  20  17*  16*   GLUCOSE  93  160*  125*   BUN  26*  30*  37*   CREATININE  1.78*  1.88*  2.67*   CALCIUM  7.4*  7.5*  7.5*     Recent Labs      04/20/18   1448  04/21/18   0445  04/21/18   2205   ALTSGPT  8   --   8   ASTSGOT  16   --   14   ALKPHOSPHAT  197*   --   198*   TBILIRUBIN  0.1   --   0.1   LIPASE  12   --    --    GLUCOSE  93  160*  125*     Recent Labs      04/20/18   1448   APTT  35.7   INR  1.07     Recent Labs      04/20/18   1448  04/21/18   2205   BNPBTYPENAT  156*  123*         Lab Results   Component Value Date    TROPONINI 0.01 04/20/2018     Urinalysis:  No results found for: SPECGRAVITY, GLUCOSEUR, KETONES, NITRITE, WBCURINE, RBCURINE, BACTERIA, EPITHELCELL     Imaging  Normal chest radiograph    Assessment/Plan     I anticipate this patient will require at least two midnights for appropriate medical management, necessitating inpatient admission.    * SBP (spontaneous bacterial peritonitis) (CMS-HCC)   Assessment & Plan    Suspected given new leukocytosis, with known ascites secondary to nephrotic syndrome.  Diagnostic paracentesis performed at bedside, and cultures sent.  Will start rocephin pending cultures, given also signs of sepsis.         Sepsis (CMS-HCC)   Assessment & Plan    This is severe sepsis with the following associated acute organ dysfunction(s): acute kidney failure.  Likely due to SBP         Nephrotic syndrome- (present on admission)   Assessment & Plan    Follows with nephrology as outpatient in [place].  On steroid therapy for minimal change disease as etiology.         Anasarca   Assessment & Plan    As a consequence of nephrotic  syndrome.         CHELSEA (acute kidney injury) (CMS-HCC)- (present on admission)   Assessment & Plan    Acute on chronic kidney injury.  Likely at least in part due to hypovolemia.              VTE prophylaxis: SCD, Heparin.

## 2018-04-22 NOTE — ED NOTES
Pt is difficult stick, pt refused 2nd set of blood cxs d/t not wanting to be stuck again for additional blood draws. Iv abx given. Pt c/o flank/back pain, requesting morphine. Pt notified of morphine order parameters and that he can have a second dose in approx 2 hours. Pt verbalized understanding. Pt offered tylenol, pt refused. Pt in nad. Updated with plan of care, awaiting tele rn to transpert to assigned room.

## 2018-04-22 NOTE — ED NOTES
Assessment made. Chart up for MD to see. Patient c/o SOB and retaining fluids. Patient as DC'd yesterday here for the same.

## 2018-04-23 ENCOUNTER — APPOINTMENT (OUTPATIENT)
Dept: RADIOLOGY | Facility: MEDICAL CENTER | Age: 43
DRG: 871 | End: 2018-04-23
Attending: HOSPITALIST
Payer: COMMERCIAL

## 2018-04-23 ENCOUNTER — PATIENT OUTREACH (OUTPATIENT)
Dept: HEALTH INFORMATION MANAGEMENT | Facility: OTHER | Age: 43
End: 2018-04-23

## 2018-04-23 PROBLEM — R18.8 CIRRHOSIS OF LIVER WITH ASCITES (HCC): Status: ACTIVE | Noted: 2018-04-22

## 2018-04-23 PROBLEM — K74.60 CIRRHOSIS OF LIVER WITH ASCITES (HCC): Status: ACTIVE | Noted: 2018-04-22

## 2018-04-23 LAB
ANION GAP SERPL CALC-SCNC: 7 MMOL/L (ref 0–11.9)
APPEARANCE UR: CLEAR
BACTERIA #/AREA URNS HPF: NEGATIVE /HPF
BASOPHILS # BLD AUTO: 0.1 % (ref 0–1.8)
BASOPHILS # BLD: 0.02 K/UL (ref 0–0.12)
BILIRUB UR QL STRIP.AUTO: NEGATIVE
BUN SERPL-MCNC: 45 MG/DL (ref 8–22)
CALCIUM SERPL-MCNC: 7.6 MG/DL (ref 8.5–10.5)
CHLORIDE SERPL-SCNC: 112 MMOL/L (ref 96–112)
CO2 SERPL-SCNC: 14 MMOL/L (ref 20–33)
COLOR UR: YELLOW
CREAT SERPL-MCNC: 2.14 MG/DL (ref 0.5–1.4)
EOSINOPHIL # BLD AUTO: 0 K/UL (ref 0–0.51)
EOSINOPHIL NFR BLD: 0 % (ref 0–6.9)
EPI CELLS #/AREA URNS HPF: ABNORMAL /HPF
ERYTHROCYTE [DISTWIDTH] IN BLOOD BY AUTOMATED COUNT: 50.8 FL (ref 35.9–50)
GLUCOSE SERPL-MCNC: 117 MG/DL (ref 65–99)
GLUCOSE UR STRIP.AUTO-MCNC: 500 MG/DL
HCT VFR BLD AUTO: 33.8 % (ref 42–52)
HGB BLD-MCNC: 10.6 G/DL (ref 14–18)
HYALINE CASTS #/AREA URNS LPF: ABNORMAL /LPF
IMM GRANULOCYTES # BLD AUTO: 0.08 K/UL (ref 0–0.11)
IMM GRANULOCYTES NFR BLD AUTO: 0.5 % (ref 0–0.9)
KETONES UR STRIP.AUTO-MCNC: NEGATIVE MG/DL
LEUKOCYTE ESTERASE UR QL STRIP.AUTO: NEGATIVE
LYMPHOCYTES # BLD AUTO: 2.32 K/UL (ref 1–4.8)
LYMPHOCYTES NFR BLD: 15.4 % (ref 22–41)
MCH RBC QN AUTO: 28.1 PG (ref 27–33)
MCHC RBC AUTO-ENTMCNC: 31.4 G/DL (ref 33.7–35.3)
MCV RBC AUTO: 89.7 FL (ref 81.4–97.8)
MICRO URNS: ABNORMAL
MONOCYTES # BLD AUTO: 0.75 K/UL (ref 0–0.85)
MONOCYTES NFR BLD AUTO: 5 % (ref 0–13.4)
NEUTROPHILS # BLD AUTO: 11.94 K/UL (ref 1.82–7.42)
NEUTROPHILS NFR BLD: 79 % (ref 44–72)
NITRITE UR QL STRIP.AUTO: NEGATIVE
NRBC # BLD AUTO: 0 K/UL
NRBC BLD-RTO: 0 /100 WBC
PH UR STRIP.AUTO: 7 [PH]
PLATELET # BLD AUTO: 530 K/UL (ref 164–446)
PMV BLD AUTO: 9 FL (ref 9–12.9)
POTASSIUM SERPL-SCNC: 4.5 MMOL/L (ref 3.6–5.5)
PROT UR QL STRIP: >=1000 MG/DL
RBC # BLD AUTO: 3.77 M/UL (ref 4.7–6.1)
RBC # URNS HPF: ABNORMAL /HPF
RBC UR QL AUTO: ABNORMAL
RENAL EPI CELLS #/AREA URNS HPF: NEGATIVE /HPF
SODIUM SERPL-SCNC: 133 MMOL/L (ref 135–145)
SP GR UR STRIP.AUTO: 1.02
UROBILINOGEN UR STRIP.AUTO-MCNC: 0.2 MG/DL
WBC # BLD AUTO: 15.1 K/UL (ref 4.8–10.8)
WBC #/AREA URNS HPF: ABNORMAL /HPF

## 2018-04-23 PROCEDURE — A9270 NON-COVERED ITEM OR SERVICE: HCPCS | Performed by: HOSPITALIST

## 2018-04-23 PROCEDURE — 80048 BASIC METABOLIC PNL TOTAL CA: CPT

## 2018-04-23 PROCEDURE — 85025 COMPLETE CBC W/AUTO DIFF WBC: CPT

## 2018-04-23 PROCEDURE — 700102 HCHG RX REV CODE 250 W/ 637 OVERRIDE(OP): Performed by: HOSPITALIST

## 2018-04-23 PROCEDURE — 76705 ECHO EXAM OF ABDOMEN: CPT

## 2018-04-23 PROCEDURE — 770020 HCHG ROOM/CARE - TELE (206)

## 2018-04-23 PROCEDURE — P9047 ALBUMIN (HUMAN), 25%, 50ML: HCPCS | Performed by: HOSPITALIST

## 2018-04-23 PROCEDURE — 81001 URINALYSIS AUTO W/SCOPE: CPT

## 2018-04-23 PROCEDURE — 700111 HCHG RX REV CODE 636 W/ 250 OVERRIDE (IP): Performed by: HOSPITALIST

## 2018-04-23 PROCEDURE — 0W9G3ZZ DRAINAGE OF PERITONEAL CAVITY, PERCUTANEOUS APPROACH: ICD-10-PCS | Performed by: RADIOLOGY

## 2018-04-23 PROCEDURE — 99233 SBSQ HOSP IP/OBS HIGH 50: CPT | Performed by: HOSPITALIST

## 2018-04-23 PROCEDURE — 36415 COLL VENOUS BLD VENIPUNCTURE: CPT

## 2018-04-23 PROCEDURE — 49083 ABD PARACENTESIS W/IMAGING: CPT

## 2018-04-23 RX ORDER — AMLODIPINE BESYLATE 5 MG/1
5 TABLET ORAL ONCE
Status: COMPLETED | OUTPATIENT
Start: 2018-04-23 | End: 2018-04-23

## 2018-04-23 RX ORDER — AMLODIPINE BESYLATE 10 MG/1
10 TABLET ORAL
Status: DISCONTINUED | OUTPATIENT
Start: 2018-04-24 | End: 2018-04-24 | Stop reason: HOSPADM

## 2018-04-23 RX ORDER — AMLODIPINE BESYLATE 5 MG/1
5 TABLET ORAL
Status: DISCONTINUED | OUTPATIENT
Start: 2018-04-23 | End: 2018-04-23

## 2018-04-23 RX ORDER — ALBUMIN (HUMAN) 12.5 G/50ML
50 SOLUTION INTRAVENOUS ONCE
Status: COMPLETED | OUTPATIENT
Start: 2018-04-23 | End: 2018-04-23

## 2018-04-23 RX ORDER — SPIRONOLACTONE 25 MG/1
100 TABLET ORAL
Status: DISCONTINUED | OUTPATIENT
Start: 2018-04-23 | End: 2018-04-24 | Stop reason: HOSPADM

## 2018-04-23 RX ADMIN — ALBUMIN (HUMAN) 50 G: 0.25 INJECTION, SOLUTION INTRAVENOUS at 11:42

## 2018-04-23 RX ADMIN — FUROSEMIDE 80 MG: 40 TABLET ORAL at 05:02

## 2018-04-23 RX ADMIN — MORPHINE SULFATE 2 MG: 4 INJECTION INTRAVENOUS at 11:10

## 2018-04-23 RX ADMIN — PREDNISONE 80 MG: 20 TABLET ORAL at 05:02

## 2018-04-23 RX ADMIN — MORPHINE SULFATE 2 MG: 4 INJECTION INTRAVENOUS at 04:53

## 2018-04-23 RX ADMIN — CLONIDINE HYDROCHLORIDE 0.1 MG: 0.1 TABLET ORAL at 04:07

## 2018-04-23 RX ADMIN — SPIRONOLACTONE 100 MG: 25 TABLET, FILM COATED ORAL at 18:09

## 2018-04-23 RX ADMIN — METOPROLOL TARTRATE 50 MG: 50 TABLET, FILM COATED ORAL at 18:09

## 2018-04-23 RX ADMIN — AMLODIPINE BESYLATE 5 MG: 5 TABLET ORAL at 11:13

## 2018-04-23 RX ADMIN — LISINOPRIL 20 MG: 20 TABLET ORAL at 05:02

## 2018-04-23 RX ADMIN — CEFTRIAXONE 2 G: 2 INJECTION, POWDER, FOR SOLUTION INTRAMUSCULAR; INTRAVENOUS at 05:00

## 2018-04-23 RX ADMIN — METOPROLOL TARTRATE 50 MG: 50 TABLET, FILM COATED ORAL at 05:02

## 2018-04-23 RX ADMIN — AMLODIPINE BESYLATE 5 MG: 5 TABLET ORAL at 18:09

## 2018-04-23 ASSESSMENT — ENCOUNTER SYMPTOMS
SORE THROAT: 0
FOCAL WEAKNESS: 0
FEVER: 0
NAUSEA: 0
WEAKNESS: 0
COUGH: 0
PALPITATIONS: 0
SHORTNESS OF BREATH: 0
CHILLS: 0
DIZZINESS: 0
DIARRHEA: 0
NECK PAIN: 0
MYALGIAS: 0
BACK PAIN: 0
DIAPHORESIS: 0
SPUTUM PRODUCTION: 0
VOMITING: 0
SPEECH CHANGE: 0
SENSORY CHANGE: 0
HEADACHES: 0
EYE PAIN: 0
EYE DISCHARGE: 0
WHEEZING: 0
HEMOPTYSIS: 0
DEPRESSION: 0
BRUISES/BLEEDS EASILY: 0
ABDOMINAL PAIN: 0
CONSTIPATION: 0
CLAUDICATION: 0
LOSS OF CONSCIOUSNESS: 0

## 2018-04-23 ASSESSMENT — LIFESTYLE VARIABLES: SUBSTANCE_ABUSE: 1

## 2018-04-23 ASSESSMENT — PAIN SCALES - GENERAL
PAINLEVEL_OUTOF10: 10

## 2018-04-23 NOTE — ASSESSMENT & PLAN NOTE
- Continue Aldactone, metoprolol, Lasix, lisinopril, and Norvasc. Follow-up with PCP for continued outpatient blood pressure monitoring.

## 2018-04-23 NOTE — ASSESSMENT & PLAN NOTE
- He will be continued on Lasix 80 mg daily, and Aldactone 100 mg daily. Counseled on maintaining low-salt hepatic diet. Follow-up with PCP.

## 2018-04-23 NOTE — THERAPY
OT order received, per RN pt is up self no mobility or ADL issues at this time, no OT eval indicated, pts six click score is 23. Please re-order should status change.

## 2018-04-23 NOTE — PROGRESS NOTES
Report received. Care assumed. Patient A&Ox4. Pt has no SOB at this time.  Pt currently has no further needs.Discussed POC for the day with Pt. Call light within reach, encouraged pt to call for assistance.

## 2018-04-23 NOTE — THERAPY
consult received; per nsg no acute therapy needs at this time as pt is essentially up without assist; noted mobility score of 23 as well per chart; will dc order unless POC or pt's current function changes

## 2018-04-23 NOTE — PROGRESS NOTES
PROCEDURE: US guided paracentesis  SONOGRAPHER: Wendi COLÓN  RADIOLOGIST: Dr. Eubanks    PT supine for therapeutic paracentesis of LLQ. PT's vitals were monitored and charted in epic. 6700 mL was removed from LLQ. PT tolerated procedure well and was discharged in stable condition.

## 2018-04-23 NOTE — PROGRESS NOTES
[facility] Hospitalist Progress Note    Date of Service: 2018    Chief Complaint  43 y.o. male admitted 2018 with h/o Hep C, meth abuse, nephrotic syndrome who was recently admitted for medication refill presents with SOB/edema.  He is here visiting his mother, lives in [place].    Interval Problem Update  :  C/o abdominal fullness, right arm swelling, SOB.  Had 200cc removed from peritoneum for anaylsis.  ORdered echo, u/s RUE, started metoprolol bid for hypertension.  Meth + on drug screen, states quit meth 2 months ago.      Consultants/Specialty  none    Disposition  Lives in [place], [place].  Here visiting his mother.        Review of Systems   Constitutional: Positive for chills and malaise/fatigue. Negative for diaphoresis and fever.   HENT: Negative for congestion and sore throat.    Eyes: Negative for pain and discharge.   Respiratory: Positive for shortness of breath. Negative for cough, hemoptysis, sputum production and wheezing.    Cardiovascular: Positive for leg swelling. Negative for chest pain, palpitations and claudication.   Gastrointestinal: Negative for abdominal pain, constipation, diarrhea, melena, nausea and vomiting.   Genitourinary: Negative for dysuria, frequency and urgency.   Musculoskeletal: Negative for back pain, joint pain, myalgias and neck pain.   Skin: Negative for itching and rash.   Neurological: Negative for dizziness, sensory change, speech change, focal weakness, loss of consciousness, weakness and headaches.   Endo/Heme/Allergies: Does not bruise/bleed easily.   Psychiatric/Behavioral: Positive for substance abuse (meth, THC). Negative for depression and suicidal ideas.      Physical Exam  Laboratory/Imaging   Hemodynamics  Temp (24hrs), Av.7 °C (98.1 °F), Min:36.4 °C (97.6 °F), Max:37 °C (98.6 °F)   Temperature: 37 °C (98.6 °F)  Pulse  Av.3  Min: 78  Max: 120 Heart Rate (Monitored): (!) 109  Blood Pressure: 156/109 (rn notified diastolyic), NIBP: (!)  167/108      Respiratory      Respiration: 18, Pulse Oximetry: 95 %             Fluids    Intake/Output Summary (Last 24 hours) at 04/22/18 2158  Last data filed at 04/22/18 2000   Gross per 24 hour   Intake                0 ml   Output             1100 ml   Net            -1100 ml       Nutrition  Orders Placed This Encounter   Procedures   • Diet Order     Standing Status:   Standing     Number of Occurrences:   1     Order Specific Question:   Diet:     Answer:   Regular [1]     Physical Exam   Constitutional: He is oriented to person, place, and time. He appears well-developed and well-nourished. No distress.   Chronically ill appearance   HENT:   Head: Normocephalic and atraumatic.   Mouth/Throat: Oropharynx is clear and moist. No oropharyngeal exudate.   Eyes: Conjunctivae and EOM are normal. Pupils are equal, round, and reactive to light. Right eye exhibits no discharge. Left eye exhibits no discharge. No scleral icterus.   Neck: Normal range of motion. Neck supple. No JVD present. No tracheal deviation present. No thyromegaly present.   Cardiovascular: Normal rate, regular rhythm and normal heart sounds.  Exam reveals no gallop and no friction rub.    No murmur heard.  Pulmonary/Chest: Effort normal and breath sounds normal. No respiratory distress. He has no wheezes. He has no rales. He exhibits no tenderness.   Abdominal: Soft. Bowel sounds are normal. He exhibits distension. He exhibits no mass. There is no tenderness. There is no rebound and no guarding.   Musculoskeletal: Normal range of motion. He exhibits edema (b/l legs, RUE >LUE). He exhibits no tenderness.   Lymphadenopathy:     He has no cervical adenopathy.   Neurological: He is alert and oriented to person, place, and time. No cranial nerve deficit. He exhibits normal muscle tone.   Skin: Skin is warm and dry. No rash noted. He is not diaphoretic. No erythema.   Psychiatric: He has a normal mood and affect. His behavior is normal. Judgment and  thought content normal.   Nursing note and vitals reviewed.      Recent Labs      04/20/18   1448  04/21/18   0445  04/21/18   2205   WBC  9.2  9.5  24.4*   RBC  3.73*  4.00*  3.94*   HEMOGLOBIN  10.4*  10.9*  10.9*   HEMATOCRIT  31.6*  33.9*  32.9*   MCV  84.7  84.8  83.5   MCH  27.9  27.3  27.7   MCHC  32.9*  32.2*  33.1*   RDW  45.1  44.1  43.8   PLATELETCT  437  432  589*   MPV  9.2  9.0  9.2     Recent Labs      04/20/18   1448  04/21/18   0445  04/21/18   2205   SODIUM  135  134*  137   POTASSIUM  3.9  4.1  4.4   CHLORIDE  108  109  111   CO2  20  17*  16*   GLUCOSE  93  160*  125*   BUN  26*  30*  37*   CREATININE  1.78*  1.88*  2.67*   CALCIUM  7.4*  7.5*  7.5*     Recent Labs      04/20/18   1448   APTT  35.7   INR  1.07     Recent Labs      04/20/18   1448  04/21/18   2205   BNPBTYPENAT  156*  123*              Assessment/Plan     * SBP (spontaneous bacterial peritonitis) (CMS-HCC)   Assessment & Plan    Suspected given new leukocytosis, with known ascites secondary to nephrotic syndrome.  Diagnostic paracentesis 200cc performed at bedside, and cultures sent.    Wbc 736 from peritoneal fluid  Continue rocephin pending cultures, given also signs of sepsis.   Ordered therapeutic u/s parecentesis.  Albumin <1.5.  Has had 1 prior paracentesis.          Sepsis (CMS-HCC)   Assessment & Plan    This is severe sepsis with the following associated acute organ dysfunction(s): acute kidney failure.  Likely due to SBP         Nephrotic syndrome- (present on admission)   Assessment & Plan    Follows with nephrology as outpatient in [place].  On steroid therapy for minimal change disease as etiology.         Uncontrolled hypertension- (present on admission)   Assessment & Plan    Started on metoprolol 50 mg bid, tachycardic.        Chronic hepatitis C without hepatic coma (CMS-HCC)- (present on admission)   Assessment & Plan    Prior paracentesis x1 with 2 L removed.  No treatment.  Follow up as outpatient.         Alcoholic hepatitis with ascites- (present on admission)   Assessment & Plan    History of alcohol abuse, iVDA, states remote  Ordered blood alcohol level  Us-paracentesis therapeutic  Hep C+        Methamphetamine abuse- (present on admission)   Assessment & Plan    Admission drug screen +  When confronted patient states he hasn't used in 2 months.        Anasarca   Assessment & Plan    nephrotic syndrome.   H/o hep C  Ordered u/s liver  Echocardiogram given meth abuse.  On lasix 80mg po daily, home dose.        Hypoalbuminemia- (present on admission)   Assessment & Plan    Likely result of hep C cirrhosis of liver  Replace with u/s paracentesis        CHELSEA (acute kidney injury) (CMS-HCC)- (present on admission)   Assessment & Plan    Acute on chronic kidney injury.  Likely at least in part due to hypovolemia.            Quality-Core Measures

## 2018-04-23 NOTE — HEART FAILURE PROGRAM
"Cardiovascular Nurse Navigator () Progress Note:      Chief Complaint: Shortness of breath, Principal problem: Spontaneous bacterial peritonitis.    Although ERP and admitting hospitalist state that patient has a history of CHF, this is not supported by echocardiogram results, nor is it an active diagnosis in the problems list or Dr. Thomas's progress note yesterday.    BNP: 156     Current echo: EF 65%, normal diastolic function, no valvular abnormalities, normal LV thickness; \"normal transthoracic echocardiogram\".    Prior cardiology encounter: none inpatient or out    Prior HF diagnosis: apparently there is a \"history\" but patient lives in [place] so this is difficult to substantiate.    Creatinine 2.67    GFR 26    Thank you, please call with questions.  "

## 2018-04-23 NOTE — OR SURGEON
Immediate Post- Operative Note        PostOp Diagnosis: ascites      Procedure(s): US guided paracentesis      Estimated Blood Loss: Less than 5 ml        Complications: None            4/23/2018     10:13 AM     Nico Eubanks

## 2018-04-24 ENCOUNTER — HOSPITAL ENCOUNTER (INPATIENT)
Facility: MEDICAL CENTER | Age: 43
LOS: 2 days | DRG: 372 | End: 2018-04-26
Attending: EMERGENCY MEDICINE | Admitting: HOSPITALIST
Payer: COMMERCIAL

## 2018-04-24 ENCOUNTER — RESOLUTE PROFESSIONAL BILLING HOSPITAL PROF FEE (OUTPATIENT)
Dept: HOSPITALIST | Facility: MEDICAL CENTER | Age: 43
End: 2018-04-24
Payer: COMMERCIAL

## 2018-04-24 VITALS
SYSTOLIC BLOOD PRESSURE: 148 MMHG | HEIGHT: 73 IN | RESPIRATION RATE: 16 BRPM | BODY MASS INDEX: 31.76 KG/M2 | TEMPERATURE: 98.3 F | OXYGEN SATURATION: 95 % | HEART RATE: 84 BPM | DIASTOLIC BLOOD PRESSURE: 99 MMHG | WEIGHT: 239.64 LBS

## 2018-04-24 DIAGNOSIS — K65.2 SPONTANEOUS BACTERIAL PERITONITIS (HCC): ICD-10-CM

## 2018-04-24 PROBLEM — N17.9 AKI (ACUTE KIDNEY INJURY) (HCC): Status: RESOLVED | Noted: 2018-04-20 | Resolved: 2018-04-24

## 2018-04-24 PROBLEM — A41.9 SEPSIS (HCC): Status: RESOLVED | Noted: 2018-04-22 | Resolved: 2018-04-24

## 2018-04-24 PROBLEM — N18.30 ACUTE RENAL FAILURE SUPERIMPOSED ON STAGE 3 CHRONIC KIDNEY DISEASE (HCC): Status: RESOLVED | Noted: 2018-04-20 | Resolved: 2018-04-24

## 2018-04-24 LAB
ALBUMIN SERPL BCP-MCNC: 1.6 G/DL (ref 3.2–4.9)
ALBUMIN SERPL BCP-MCNC: 2 G/DL (ref 3.2–4.9)
ALBUMIN/GLOB SERPL: 0.5 G/DL
ALBUMIN/GLOB SERPL: 0.5 G/DL
ALP SERPL-CCNC: 135 U/L (ref 30–99)
ALP SERPL-CCNC: 240 U/L (ref 30–99)
ALT SERPL-CCNC: 102 U/L (ref 2–50)
ALT SERPL-CCNC: 22 U/L (ref 2–50)
ANION GAP SERPL CALC-SCNC: 10 MMOL/L (ref 0–11.9)
ANION GAP SERPL CALC-SCNC: 8 MMOL/L (ref 0–11.9)
AST SERPL-CCNC: 29 U/L (ref 12–45)
AST SERPL-CCNC: 95 U/L (ref 12–45)
BASOPHILS # BLD AUTO: 0.1 % (ref 0–1.8)
BASOPHILS # BLD AUTO: 0.2 % (ref 0–1.8)
BASOPHILS # BLD: 0.01 K/UL (ref 0–0.12)
BASOPHILS # BLD: 0.03 K/UL (ref 0–0.12)
BILIRUB SERPL-MCNC: 0.1 MG/DL (ref 0.1–1.5)
BILIRUB SERPL-MCNC: 0.1 MG/DL (ref 0.1–1.5)
BLOOD CULTURE HOLD CXBCH: NORMAL
BUN SERPL-MCNC: 44 MG/DL (ref 8–22)
BUN SERPL-MCNC: 51 MG/DL (ref 8–22)
CALCIUM SERPL-MCNC: 7.2 MG/DL (ref 8.5–10.5)
CALCIUM SERPL-MCNC: 8 MG/DL (ref 8.5–10.5)
CHLORIDE SERPL-SCNC: 112 MMOL/L (ref 96–112)
CHLORIDE SERPL-SCNC: 112 MMOL/L (ref 96–112)
CO2 SERPL-SCNC: 16 MMOL/L (ref 20–33)
CO2 SERPL-SCNC: 16 MMOL/L (ref 20–33)
CREAT SERPL-MCNC: 1.63 MG/DL (ref 0.5–1.4)
CREAT SERPL-MCNC: 1.93 MG/DL (ref 0.5–1.4)
EOSINOPHIL # BLD AUTO: 0 K/UL (ref 0–0.51)
EOSINOPHIL # BLD AUTO: 0 K/UL (ref 0–0.51)
EOSINOPHIL NFR BLD: 0 % (ref 0–6.9)
EOSINOPHIL NFR BLD: 0 % (ref 0–6.9)
ERYTHROCYTE [DISTWIDTH] IN BLOOD BY AUTOMATED COUNT: 47 FL (ref 35.9–50)
ERYTHROCYTE [DISTWIDTH] IN BLOOD BY AUTOMATED COUNT: 48.7 FL (ref 35.9–50)
GLOBULIN SER CALC-MCNC: 3.1 G/DL (ref 1.9–3.5)
GLOBULIN SER CALC-MCNC: 3.8 G/DL (ref 1.9–3.5)
GLUCOSE SERPL-MCNC: 160 MG/DL (ref 65–99)
GLUCOSE SERPL-MCNC: 92 MG/DL (ref 65–99)
HCT VFR BLD AUTO: 32.7 % (ref 42–52)
HCT VFR BLD AUTO: 38.5 % (ref 42–52)
HGB BLD-MCNC: 10.6 G/DL (ref 14–18)
HGB BLD-MCNC: 12.3 G/DL (ref 14–18)
IMM GRANULOCYTES # BLD AUTO: 0.06 K/UL (ref 0–0.11)
IMM GRANULOCYTES # BLD AUTO: 0.1 K/UL (ref 0–0.11)
IMM GRANULOCYTES NFR BLD AUTO: 0.5 % (ref 0–0.9)
IMM GRANULOCYTES NFR BLD AUTO: 0.6 % (ref 0–0.9)
LIPASE SERPL-CCNC: 36 U/L (ref 11–82)
LYMPHOCYTES # BLD AUTO: 1.83 K/UL (ref 1–4.8)
LYMPHOCYTES # BLD AUTO: 3.98 K/UL (ref 1–4.8)
LYMPHOCYTES NFR BLD: 16.1 % (ref 22–41)
LYMPHOCYTES NFR BLD: 24.9 % (ref 22–41)
MCH RBC QN AUTO: 27.7 PG (ref 27–33)
MCH RBC QN AUTO: 27.8 PG (ref 27–33)
MCHC RBC AUTO-ENTMCNC: 31.9 G/DL (ref 33.7–35.3)
MCHC RBC AUTO-ENTMCNC: 32.4 G/DL (ref 33.7–35.3)
MCV RBC AUTO: 85.4 FL (ref 81.4–97.8)
MCV RBC AUTO: 87.1 FL (ref 81.4–97.8)
MONOCYTES # BLD AUTO: 0.44 K/UL (ref 0–0.85)
MONOCYTES # BLD AUTO: 1.4 K/UL (ref 0–0.85)
MONOCYTES NFR BLD AUTO: 3.9 % (ref 0–13.4)
MONOCYTES NFR BLD AUTO: 8.8 % (ref 0–13.4)
NEUTROPHILS # BLD AUTO: 10.48 K/UL (ref 1.82–7.42)
NEUTROPHILS # BLD AUTO: 9.04 K/UL (ref 1.82–7.42)
NEUTROPHILS NFR BLD: 65.5 % (ref 44–72)
NEUTROPHILS NFR BLD: 79.4 % (ref 44–72)
NRBC # BLD AUTO: 0 K/UL
NRBC # BLD AUTO: 0 K/UL
NRBC BLD-RTO: 0 /100 WBC
NRBC BLD-RTO: 0 /100 WBC
PLATELET # BLD AUTO: 522 K/UL (ref 164–446)
PLATELET # BLD AUTO: 689 K/UL (ref 164–446)
PMV BLD AUTO: 8.8 FL (ref 9–12.9)
PMV BLD AUTO: 9 FL (ref 9–12.9)
POTASSIUM SERPL-SCNC: 4.3 MMOL/L (ref 3.6–5.5)
POTASSIUM SERPL-SCNC: 4.7 MMOL/L (ref 3.6–5.5)
PROT SERPL-MCNC: 4.7 G/DL (ref 6–8.2)
PROT SERPL-MCNC: 5.8 G/DL (ref 6–8.2)
RBC # BLD AUTO: 3.83 M/UL (ref 4.7–6.1)
RBC # BLD AUTO: 4.42 M/UL (ref 4.7–6.1)
SODIUM SERPL-SCNC: 136 MMOL/L (ref 135–145)
SODIUM SERPL-SCNC: 138 MMOL/L (ref 135–145)
WBC # BLD AUTO: 11.4 K/UL (ref 4.8–10.8)
WBC # BLD AUTO: 16 K/UL (ref 4.8–10.8)

## 2018-04-24 PROCEDURE — 99285 EMERGENCY DEPT VISIT HI MDM: CPT

## 2018-04-24 PROCEDURE — 80053 COMPREHEN METABOLIC PANEL: CPT

## 2018-04-24 PROCEDURE — A9270 NON-COVERED ITEM OR SERVICE: HCPCS | Performed by: HOSPITALIST

## 2018-04-24 PROCEDURE — 87070 CULTURE OTHR SPECIMN AEROBIC: CPT

## 2018-04-24 PROCEDURE — 700102 HCHG RX REV CODE 250 W/ 637 OVERRIDE(OP): Performed by: HOSPITALIST

## 2018-04-24 PROCEDURE — 85025 COMPLETE CBC W/AUTO DIFF WBC: CPT | Mod: 91

## 2018-04-24 PROCEDURE — 307738 PARACENTESIS ABDOMINAL KIT: Performed by: EMERGENCY MEDICINE

## 2018-04-24 PROCEDURE — 700111 HCHG RX REV CODE 636 W/ 250 OVERRIDE (IP): Performed by: HOSPITALIST

## 2018-04-24 PROCEDURE — 770006 HCHG ROOM/CARE - MED/SURG/GYN SEMI*

## 2018-04-24 PROCEDURE — 80053 COMPREHEN METABOLIC PANEL: CPT | Mod: 91

## 2018-04-24 PROCEDURE — 700101 HCHG RX REV CODE 250: Performed by: EMERGENCY MEDICINE

## 2018-04-24 PROCEDURE — 83690 ASSAY OF LIPASE: CPT

## 2018-04-24 PROCEDURE — 36415 COLL VENOUS BLD VENIPUNCTURE: CPT

## 2018-04-24 PROCEDURE — 89051 BODY FLUID CELL COUNT: CPT

## 2018-04-24 PROCEDURE — 85025 COMPLETE CBC W/AUTO DIFF WBC: CPT

## 2018-04-24 PROCEDURE — 99239 HOSP IP/OBS DSCHRG MGMT >30: CPT | Performed by: INTERNAL MEDICINE

## 2018-04-24 PROCEDURE — 99223 1ST HOSP IP/OBS HIGH 75: CPT | Mod: 25 | Performed by: HOSPITALIST

## 2018-04-24 PROCEDURE — 87205 SMEAR GRAM STAIN: CPT

## 2018-04-24 PROCEDURE — 99406 BEHAV CHNG SMOKING 3-10 MIN: CPT | Performed by: HOSPITALIST

## 2018-04-24 PROCEDURE — 49082 ABD PARACENTESIS: CPT

## 2018-04-24 PROCEDURE — 0W9G3ZZ DRAINAGE OF PERITONEAL CAVITY, PERCUTANEOUS APPROACH: ICD-10-PCS | Performed by: EMERGENCY MEDICINE

## 2018-04-24 RX ORDER — LIDOCAINE HYDROCHLORIDE 10 MG/ML
20 INJECTION, SOLUTION INFILTRATION; PERINEURAL ONCE
Status: COMPLETED | OUTPATIENT
Start: 2018-04-24 | End: 2018-04-24

## 2018-04-24 RX ORDER — CEFTRIAXONE 2 G/1
2 INJECTION, POWDER, FOR SOLUTION INTRAMUSCULAR; INTRAVENOUS ONCE
Status: COMPLETED | OUTPATIENT
Start: 2018-04-25 | End: 2018-04-25

## 2018-04-24 RX ORDER — ALBUMIN (HUMAN) 12.5 G/50ML
25 SOLUTION INTRAVENOUS ONCE
Status: COMPLETED | OUTPATIENT
Start: 2018-04-25 | End: 2018-04-25

## 2018-04-24 RX ORDER — CEFDINIR 300 MG/1
300 CAPSULE ORAL 2 TIMES DAILY
Qty: 14 CAP | Refills: 0 | Status: ON HOLD | OUTPATIENT
Start: 2018-04-24 | End: 2018-04-26

## 2018-04-24 RX ORDER — SPIRONOLACTONE 100 MG/1
100 TABLET, FILM COATED ORAL DAILY
Qty: 30 TAB | Refills: 3 | Status: ON HOLD | OUTPATIENT
Start: 2018-04-25 | End: 2018-04-26

## 2018-04-24 RX ADMIN — FUROSEMIDE 80 MG: 40 TABLET ORAL at 05:51

## 2018-04-24 RX ADMIN — METOPROLOL TARTRATE 25 MG: 25 TABLET, FILM COATED ORAL at 05:52

## 2018-04-24 RX ADMIN — LIDOCAINE HYDROCHLORIDE 20 ML: 10 INJECTION, SOLUTION INFILTRATION; PERINEURAL at 23:00

## 2018-04-24 RX ADMIN — SPIRONOLACTONE 100 MG: 25 TABLET, FILM COATED ORAL at 05:52

## 2018-04-24 RX ADMIN — CEFTRIAXONE 2 G: 2 INJECTION, POWDER, FOR SOLUTION INTRAMUSCULAR; INTRAVENOUS at 06:39

## 2018-04-24 RX ADMIN — PREDNISONE 80 MG: 20 TABLET ORAL at 05:52

## 2018-04-24 RX ADMIN — AMLODIPINE BESYLATE 10 MG: 10 TABLET ORAL at 05:53

## 2018-04-24 ASSESSMENT — PAIN SCALES - GENERAL
PAINLEVEL_OUTOF10: 0
PAINLEVEL_OUTOF10: 0
PAINLEVEL_OUTOF10: 10
PAINLEVEL_OUTOF10: 4

## 2018-04-24 NOTE — PROGRESS NOTES
[facility] Hospitalist Progress Note    Date of Service: 4/23/2018    Chief Complaint  43 y.o. male admitted 4/21/2018 with h/o Hep C, meth abuse, nephrotic syndrome who was recently admitted for medication refill presents with SOB/edema.  He is here visiting his mother, lives in [place].    Interval Problem Update  4/22:  C/o abdominal fullness, right arm swelling, SOB.  Had 200cc removed from peritoneum for anaylsis.  ORdered echo, u/s RUE, started metoprolol bid for hypertension.  Meth + on drug screen, states quit meth 2 months ago.   4/23:  S/p 6700ml off with IR paracentesis this a.m.  Feeling much improved.  U/s GB with cirrhosis of liver.  Wbc down 24K to 15K, PF culture pending final result, thus far no growth.  May be steroid effect since restarted prednisone 80mg daily.  Low salt hepatic diet started, BP remains elevated, received albumin per protocol today.  Added norvasc to 10mg po daily, cut back metoprolol 50 bid to  25 bid due to HR 47 brief.  Added spironolactone 100mg po daily for cirrhosis with ascites.  Monitor Cr since remains elevated, but mildly improved.  CO2 dropped this am. Likely from rapid breathing limited by ascites.  dc'd morphine since no longer with abd discomfort from ascites.    Consultants/Specialty  none    Disposition  Lives in [place], [place].  Here visiting his mother.  Has bus ticket for [place] once medically stable.        Review of Systems   Constitutional: Negative for chills, diaphoresis, fever and malaise/fatigue.   HENT: Negative for congestion and sore throat.    Eyes: Negative for pain and discharge.   Respiratory: Negative for cough, hemoptysis, sputum production, shortness of breath and wheezing.    Cardiovascular: Positive for leg swelling. Negative for chest pain, palpitations and claudication.   Gastrointestinal: Negative for abdominal pain, constipation, diarrhea, melena, nausea and vomiting.   Genitourinary: Negative for dysuria, frequency and urgency.    Musculoskeletal: Negative for back pain, joint pain, myalgias and neck pain.   Skin: Negative for itching and rash.   Neurological: Negative for dizziness, sensory change, speech change, focal weakness, loss of consciousness, weakness and headaches.   Endo/Heme/Allergies: Does not bruise/bleed easily.   Psychiatric/Behavioral: Positive for substance abuse (meth, THC). Negative for depression and suicidal ideas.      Physical Exam  Laboratory/Imaging   Hemodynamics  Temp (24hrs), Av.9 °C (98.5 °F), Min:36.4 °C (97.6 °F), Max:37.2 °C (98.9 °F)   Temperature: 36.8 °C (98.2 °F)  Pulse  Av.3  Min: 64  Max: 120   Blood Pressure: 146/102 (rn notified diastolyc)      Respiratory      Respiration: 16, Pulse Oximetry: 94 %             Fluids    Intake/Output Summary (Last 24 hours) at 18  Last data filed at 18 1900   Gross per 24 hour   Intake                0 ml   Output             1800 ml   Net            -1800 ml       Nutrition  Orders Placed This Encounter   Procedures   • DIET ORDER     Standing Status:   Standing     Number of Occurrences:   1     Order Specific Question:   Diet:     Answer:   Hepatic [9]     Order Specific Question:   Electrolyte modifications:     Answer:   1.5 g Sodium [1]     Order Specific Question:   Consistency/Fluid modifications:     Answer:   1000 ml Fluid Restriction [7]     Physical Exam   Constitutional: He is oriented to person, place, and time. He appears well-developed and well-nourished. No distress.   Chronically ill appearance   HENT:   Head: Normocephalic and atraumatic.   Mouth/Throat: Oropharynx is clear and moist. No oropharyngeal exudate.   Eyes: Conjunctivae and EOM are normal. Pupils are equal, round, and reactive to light. Right eye exhibits no discharge. Left eye exhibits no discharge. No scleral icterus.   Neck: Normal range of motion. Neck supple. No JVD present. No tracheal deviation present. No thyromegaly present.   Cardiovascular: Normal  rate, regular rhythm and normal heart sounds.  Exam reveals no gallop and no friction rub.    No murmur heard.  Pulmonary/Chest: Effort normal and breath sounds normal. No respiratory distress. He has no wheezes. He has no rales. He exhibits no tenderness.   Abdominal: Soft. Bowel sounds are normal. He exhibits no distension and no mass. There is no tenderness. There is no rebound and no guarding.   S/p paracentesis of 6700ml fluid removed.   Musculoskeletal: Normal range of motion. He exhibits edema (b/l legs and upper extremities decreased s/p paracentesis.). He exhibits no tenderness.   Lymphadenopathy:     He has no cervical adenopathy.   Neurological: He is alert and oriented to person, place, and time. No cranial nerve deficit. He exhibits normal muscle tone.   Skin: Skin is warm and dry. No rash noted. He is not diaphoretic. No erythema.   Psychiatric: He has a normal mood and affect. His behavior is normal. Judgment and thought content normal.   Nursing note and vitals reviewed.      Recent Labs      04/21/18 0445 04/21/18 2205 04/23/18   0401   WBC  9.5  24.4*  15.1*   RBC  4.00*  3.94*  3.77*   HEMOGLOBIN  10.9*  10.9*  10.6*   HEMATOCRIT  33.9*  32.9*  33.8*   MCV  84.8  83.5  89.7   MCH  27.3  27.7  28.1   MCHC  32.2*  33.1*  31.4*   RDW  44.1  43.8  50.8*   PLATELETCT  432  589*  530*   MPV  9.0  9.2  9.0     Recent Labs      04/21/18 0445 04/21/18 2205 04/23/18   0401   SODIUM  134*  137  133*   POTASSIUM  4.1  4.4  4.5   CHLORIDE  109  111  112   CO2  17*  16*  14*   GLUCOSE  160*  125*  117*   BUN  30*  37*  45*   CREATININE  1.88*  2.67*  2.14*   CALCIUM  7.5*  7.5*  7.6*         Recent Labs      04/21/18 2205   BNPBTYPENAT  123*              Assessment/Plan     * SBP (spontaneous bacterial peritonitis) (CMS-HCC)   Assessment & Plan    Suspected given new leukocytosis, with known ascites secondary to nephrotic syndrome.  Diagnostic paracentesis 200cc performed at bedside, and cultures  sent.    Wbc 736 from peritoneal fluid  Continue rocephin pending cultures, given also signs of sepsis.   Ordered therapeutic u/s parecentesis.  Albumin <1.5.  Has had 1 prior paracentesis.          Sepsis (CMS-HCC)   Assessment & Plan    This is severe sepsis with the following associated acute organ dysfunction(s): acute kidney failure.  Likely due to SBP   4/23:  Resolving.  BC negative, PF pending.        Nephrotic syndrome- (present on admission)   Assessment & Plan    Follows with nephrology as outpatient in [place].  On steroid therapy for minimal change disease as etiology.   Check UA for protein.        Uncontrolled hypertension- (present on admission)   Assessment & Plan    4/22:  Started on metoprolol 50 mg bid, tachycardic.  4/23:  Remains elevated 170/109.  Added norvasc 10mg po daily, HR 40s, decreased metoprolol 25 bid, added spironolactone 100mg po daily.  PRN bp meds.        Chronic hepatitis C without hepatic coma (CMS-HCC)- (present on admission)   Assessment & Plan    Prior paracentesis x1 with 2 L removed.  No treatment.  Follow up as outpatient.        Cirrhosis of liver with ascites (CMS-HCC)- (present on admission)   Assessment & Plan    History of alcohol abuse, iVDA, states remote   blood alcohol level negative.  4/23: Us-paracentesis by IR removed 6700cc.  Feeling much improved, no further pain in abdomen.  Albumin replacement protocol started.  On lasix 80mg po daily, added spironolactone 100mg po daily, BP high.    Placed on low salt hepatic diet, dc'd morphine started on admission.  Hep C+        Methamphetamine abuse- (present on admission)   Assessment & Plan    Admission drug screen +  When confronted patient states he hasn't used in 2 months.  counseled patient about cessation.        Anasarca   Assessment & Plan    nephrotic syndrome.   H/o hep C  u/s liver with cirrhosis with ascites.  Echocardiogram ef 65%.  On lasix 80mg po daily, home dose.        Hypoalbuminemia- (present  on admission)   Assessment & Plan    Likely result of hep C cirrhosis of liver  Replace with u/s paracentesis        CHELSEA (acute kidney injury) (CMS-HCC)- (present on admission)   Assessment & Plan    Acute on chronic kidney injury.  Had nephrotic syndrome and uncontrolled HTN.          Quality-Core Measures

## 2018-04-24 NOTE — PROGRESS NOTES
"Report received. Care assumed. Patient A&Ox4. Pt reports feeling 12/10 pain-sitting calmly in bed, no SOB at this time.  Pt currently requesting \"TV dinner\", re-educated patient about new diet order and the importance of limiting salt intake.Discussed POC for the day with Pt. Call light within reach, encouraged pt to call for assistance.     "

## 2018-04-24 NOTE — PROGRESS NOTES
Pt dc'd home. IV and monitor removed; monitor room notified. Pt left unit walking. Personal belongings with pt when leaving unit. Pt given discharge instructions prior to leaving unit including prescription and when to visit with physician; verbalizes understanding. Copy of discharge instructions with pt and in the chart.

## 2018-04-24 NOTE — PROGRESS NOTES
Comment   Acute heart failure ruled out. HF education and seven day HF f/u appt not indicated. Thank you and please call Aminata DUPONT with questions: [phone]. Thank you.   Last edited by Aminata Martinez R.N. on 04/24/18 at 7571

## 2018-04-24 NOTE — CARE PLAN
Problem: Safety  Goal: Will remain free from falls  Outcome: PROGRESSING AS EXPECTED  Fall risk assessed every shift and fall precautions in place.  Pt educated to not get up without assistance.  Verbalized understanding.       Problem: Knowledge Deficit  Goal: Knowledge of the prescribed therapeutic regimen will improve  Outcome: PROGRESSING AS EXPECTED  Pt educated regarding activity, diet, meds and plan of care. Verbalized understanding.

## 2018-04-24 NOTE — DISCHARGE INSTRUCTIONS
Discharge Instructions    Discharged to home by car with relative. Discharged via walking, hospital escort: Refused.  Special equipment needed: Not Applicable    Be sure to schedule a follow-up appointment with your primary care doctor or any specialists as instructed.     Discharge Plan:   Diet Plan: Discussed  Activity Level: Discussed  Smoking Cessation Offered: Patient Counseled  Confirmed Follow up Appointment: Patient to Call and Schedule Appointment  Confirmed Symptoms Management: Discussed  Medication Reconciliation Updated: Yes  Influenza Vaccine Indication: Not indicated: Previously immunized this influenza season and > 8 years of age    I understand that a diet low in cholesterol, fat, and sodium is recommended for good health. Unless I have been given specific instructions below for another diet, I accept this instruction as my diet prescription.   Other diet: Hepatic, low sodium    Special Instructions: None    · Is patient discharged on Warfarin / Coumadin?   No     Depression / Suicide Risk    As you are discharged from this [facility] facility, it is important to learn how to keep safe from harming yourself.    Recognize the warning signs:  · Abrupt changes in personality, positive or negative- including increase in energy   · Giving away possessions  · Change in eating patterns- significant weight changes-  positive or negative  · Change in sleeping patterns- unable to sleep or sleeping all the time   · Unwillingness or inability to communicate  · Depression  · Unusual sadness, discouragement and loneliness  · Talk of wanting to die  · Neglect of personal appearance   · Rebelliousness- reckless behavior  · Withdrawal from people/activities they love  · Confusion- inability to concentrate     If you or a loved one observes any of these behaviors or has concerns about self-harm, here's what you can do:  · Talk about it- your feelings and reasons for harming yourself  · Remove any means that you  might use to hurt yourself (examples: pills, rope, extension cords, firearm)  · Get professional help from the community (Mental Health, Substance Abuse, psychological counseling)  · Do not be alone:Call your Safe Contact- someone whom you trust who will be there for you.  · Call your local CRISIS HOTLINE 871-8033 or 207-481-3322  · Call your local Children's Mobile Crisis Response Team [place] (565) 119-3908 or wwwTandemLaunch  · Call the toll free National Suicide Prevention Hotlines   · National Suicide Prevention Lifeline 309-311-FKEX (5665)  · National Hope Line Network 800-SUICIDE (298-8556)    Metoprolol tablets  What is this medicine?  METOPROLOL (me TOE proe lole) is a beta-blocker. Beta-blockers reduce the workload on the heart and help it to beat more regularly. This medicine is used to treat high blood pressure and to prevent chest pain. It is also used to after a heart attack and to prevent an additional heart attack from occurring.  This medicine may be used for other purposes; ask your health care provider or pharmacist if you have questions.  COMMON BRAND NAME(S): Lopressor  What should I tell my health care provider before I take this medicine?  They need to know if you have any of these conditions:  -diabetes  -heart or vessel disease like slow heart rate, worsening heart failure, heart block, sick sinus syndrome or Raynaud's disease  -kidney disease  -liver disease  -lung or breathing disease, like asthma or emphysema  -pheochromocytoma  -thyroid disease  -an unusual or allergic reaction to metoprolol, other beta-blockers, medicines, foods, dyes, or preservatives  -pregnant or trying to get pregnant  -breast-feeding  How should I use this medicine?  Take this medicine by mouth with a drink of water. Follow the directions on the prescription label. Take this medicine immediately after meals. Take your doses at regular intervals. Do not take more medicine than directed. Do not stop taking  this medicine suddenly. This could lead to serious heart-related effects.  Talk to your pediatrician regarding the use of this medicine in children. Special care may be needed.  Overdosage: If you think you have taken too much of this medicine contact a poison control center or emergency room at once.  NOTE: This medicine is only for you. Do not share this medicine with others.  What if I miss a dose?  If you miss a dose, take it as soon as you can. If it is almost time for your next dose, take only that dose. Do not take double or extra doses.  What may interact with this medicine?  This medicine may interact with the following medications:  -certain medicines for blood pressure, heart disease, irregular heart beat  -certain medicines for depression like monoamine oxidase (MAO) inhibitors, fluoxetine, or paroxetine  -clonidine  -dobutamine  -epinephrine  -isoproterenol  -reserpine  This list may not describe all possible interactions. Give your health care provider a list of all the medicines, herbs, non-prescription drugs, or dietary supplements you use. Also tell them if you smoke, drink alcohol, or use illegal drugs. Some items may interact with your medicine.  What should I watch for while using this medicine?  Visit your doctor or health care professional for regular check ups. Contact your doctor right away if your symptoms worsen. Check your blood pressure and pulse rate regularly. Ask your health care professional what your blood pressure and pulse rate should be, and when you should contact them.  You may get drowsy or dizzy. Do not drive, use machinery, or do anything that needs mental alertness until you know how this medicine affects you. Do not sit or stand up quickly, especially if you are an older patient. This reduces the risk of dizzy or fainting spells. Contact your doctor if these symptoms continue. Alcohol may interfere with the effect of this medicine. Avoid alcoholic drinks.  What side effects  may I notice from receiving this medicine?  Side effects that you should report to your doctor or health care professional as soon as possible:  -allergic reactions like skin rash, itching or hives  -cold or numb hands or feet  -depression  -difficulty breathing  -faint  -fever with sore throat  -irregular heartbeat, chest pain  -rapid weight gain  -swollen legs or ankles  Side effects that usually do not require medical attention (report to your doctor or health care professional if they continue or are bothersome):  -anxiety or nervousness  -change in sex drive or performance  -dry skin  -headache  -nightmares or trouble sleeping  -short term memory loss  -stomach upset or diarrhea  -unusually tired  This list may not describe all possible side effects. Call your doctor for medical advice about side effects. You may report side effects to FDA at 3-767-FDA-6200.  Where should I keep my medicine?  Keep out of the reach of children.  Store at room temperature between 15 and 30 degrees C (59 and 86 degrees F). Throw away any unused medicine after the expiration date.  NOTE: This sheet is a summary. It may not cover all possible information. If you have questions about this medicine, talk to your doctor, pharmacist, or health care provider.  © 2018 Elsevier/Gold Standard (2014-08-22 14:40:36)    Spironolactone tablets  What is this medicine?  SPIRONOLACTONE ([place] LAK tone) is a diuretic. It helps you make more urine and to lose excess water from your body. This medicine is used to treat high blood pressure, and edema or swelling from heart, kidney, or liver disease. It is also used to treat patients who make too much aldosterone or have low potassium.  This medicine may be used for other purposes; ask your health care provider or pharmacist if you have questions.  COMMON BRAND NAME(S): Aldactone  What should I tell my health care provider before I take this medicine?  They need to know if you have any of these  conditions:  -high blood level of potassium  -kidney disease or trouble making urine  -liver disease  -an unusual or allergic reaction to spironolactone, other medicines, foods, dyes, or preservatives  -pregnant or trying to get pregnant  -breast-feeding  How should I use this medicine?  Take this medicine by mouth with a drink of water. Follow the directions on your prescription label. You can take it with or without food. If it upsets your stomach, take it with food. Do not take your medicine more often than directed. Remember that you will need to pass more urine after taking this medicine. Do not take your doses at a time of day that will cause you problems. Do not take at bedtime.  Talk to your pediatrician regarding the use of this medicine in children. While this drug may be prescribed for selected conditions, precautions do apply.  Overdosage: If you think you have taken too much of this medicine contact a poison control center or emergency room at once.  NOTE: This medicine is only for you. Do not share this medicine with others.  What if I miss a dose?  If you miss a dose, take it as soon as you can. If it is almost time for your next dose, take only that dose. Do not take double or extra doses.  What may interact with this medicine?  Do not take this medicine with any of the following medications:  -eplerenone  This medicine may also interact with the following medications:  -corticosteroids  -digoxin  -lithium  -medicines for high blood pressure like ACE inhibitors  -skeletal muscle relaxants like tubocurarine  -NSAIDs, medicines for pain and inflammation, like ibuprofen or naproxen  -potassium products like salt substitute or supplements  -pressor amines like norepinephrine  -some diuretics  This list may not describe all possible interactions. Give your health care provider a list of all the medicines, herbs, non-prescription drugs, or dietary supplements you use. Also tell them if you smoke, drink  alcohol, or use illegal drugs. Some items may interact with your medicine.  What should I watch for while using this medicine?  Visit your doctor or health care professional for regular checks on your progress. Check your blood pressure as directed. Ask your doctor what your blood pressure should be, and when you should contact them.  You may need to be on a special diet while taking this medicine. Ask your doctor. Also, ask how many glasses of fluid you need to drink a day. You must not get dehydrated.  This medicine may make you feel confused, dizzy or lightheaded. Drinking alcohol and taking some medicines can make this worse. Do not drive, use machinery, or do anything that needs mental alertness until you know how this medicine affects you. Do not sit or stand up quickly.  What side effects may I notice from receiving this medicine?  Side effects that you should report to your doctor or health care professional as soon as possible:  -allergic reactions such as skin rash or itching, hives, swelling of the lips, mouth, tongue, or throat  -black or tarry stools  -fast, irregular heartbeat  -fever  -muscle pain, cramps  -numbness, tingling in hands or feet  -trouble breathing  -trouble passing urine  -unusual bleeding  -unusually weak or tired  Side effects that usually do not require medical attention (report to your doctor or health care professional if they continue or are bothersome):  -change in voice or hair growth  -confusion  -dizzy, drowsy  -dry mouth, increased thirst  -enlarged or tender breasts  -headache  -irregular menstrual periods  -sexual difficulty, unable to have an erection  -stomach upset  This list may not describe all possible side effects. Call your doctor for medical advice about side effects. You may report side effects to FDA at 9-957-FDA-6083.  Where should I keep my medicine?  Keep out of the reach of children.  Store below 25 degrees C (77 degrees F). Throw away any unused medicine  after the expiration date.  NOTE: This sheet is a summary. It may not cover all possible information. If you have questions about this medicine, talk to your doctor, pharmacist, or health care provider.  © 2018 Elsevier/Gold Standard (2011-08-30 12:51:30)    Paracentesis  Introduction  Paracentesis is a procedure to remove excess fluid (ascites) from the belly (abdomen). Ascites can result from certain conditions, such as infection, inflammation, abdominal injury, heart failure, chronic scarring of the liver (cirrhosis), or cancer. Ascites is removed using a needle that is inserted through the skin and tissue into the abdomen.  This procedure may be done:  · To determine the cause of the ascites.  · To relieve symptoms that are caused by the ascites, such as pain or shortness of breath.  · To see if there is bleeding after an abdominal injury.  Tell a health care provider about:  · Any allergies you have.  · All medicines you are taking, including vitamins, herbs, eye drops, creams, and over-the-counter medicines.  · Any problems you or family members have had with anesthetic medicines.  · Any blood disorders you have.  · Any surgeries you have had.  · Any medical conditions you have.  · Whether you are pregnant or may be pregnant.  What are the risks?  Generally, this is a safe procedure. However, problems may occur, including:  · Infection.  · Bleeding.  · Injury to an abdominal organ, such as the bowel (large intestine), liver, spleen, or bladder.  · Low blood pressure (hypotension).  · Spreading of cancer, if there are cancer cells in the abdominal fluid.  · Mental status changes in people who have liver disease. These changes would be caused by shifts in the balance of fluids and minerals (electrolytes) in the body.  What happens before the procedure?  · Ask your health care provider about:  ¨ Changing or stopping your regular medicines. This is especially important if you are taking diabetes medicines or blood  thinners.  ¨ Taking medicines such as aspirin and ibuprofen. These medicines can thin your blood. Do not take these medicines before your procedure if your health care provider instructs you not to.  · A blood sample may be done to determine your blood clotting time.  · You will be asked to urinate.  What happens during the procedure?  · You may be asked to lie on your back with your head raised (elevated).  · To reduce your risk of infection:  ¨ Your health care team will wash or sanitize their hands.  ¨ Your skin will be washed with soap.  · You will be given a medicine to numb the area (local anesthetic).  · Your abdominal skin will be punctured with a needle or a scalpel.  · A drainage tube will be inserted through the puncture site. Fluid will drain through the tube into a container.  · After enough fluid has been removed, the tube will be removed.  · A sample of the fluid will be sent for examination.  · A bandage (dressing) will be placed over the puncture site.  The procedure may vary among health care providers and hospitals.  What happens after the procedure?  · It is your responsibility to get your test results. Ask your health care provider or the department performing the test when your results will be ready.  This information is not intended to replace advice given to you by your health care provider. Make sure you discuss any questions you have with your health care provider.  Document Released: 07/03/2006 Document Revised: 05/25/2017 Document Reviewed: 03/01/2016  © 2017 Elsevier    Cirrhosis  Cirrhosis is long-term (chronic) liver injury. The liver is your largest internal organ, and it performs many functions. The liver converts food into energy, removes toxic material from your blood, makes important proteins, and absorbs necessary vitamins from your diet.  If you have cirrhosis, it means many of your healthy liver cells have been replaced by scar tissue. This prevents blood from flowing through  your liver, which makes it difficult for your liver to function. This scarring is not reversible, but treatment can prevent it from getting worse.  What are the causes?  Hepatitis C and long-term alcohol abuse are the most common causes of cirrhosis. Other causes include:  · Nonalcoholic fatty liver disease.  · Hepatitis B infection.  · Autoimmune hepatitis.  · Diseases that cause blockage of ducts inside the liver.  · Inherited liver diseases.  · Reactions to certain long-term medicines.  · Parasitic infections.  · Long-term exposure to certain toxins.  What increases the risk?  You may have a higher risk of cirrhosis if you:  · Have certain hepatitis viruses.  · Abuse alcohol, especially if you are female.  · Are overweight.  · Share needles.  · Have unprotected sex with someone who has hepatitis.  What are the signs or symptoms?  You may not have any signs and symptoms at first. Symptoms may not develop until the damage to your liver starts to get worse. Signs and symptoms of cirrhosis may include:  · Tenderness in the right-upper part of your abdomen.  · Weakness and tiredness (fatigue).  · Loss of appetite.  · Nausea.  · Weight loss and muscle loss.  · Itchiness.  · Yellow skin and eyes (jaundice).  · Buildup of fluid in the abdomen (ascites).  · Swelling of the feet and ankles (edema).  · Appearance of tiny blood vessels under the skin.  · Mental confusion.  · Easy bruising and bleeding.  How is this diagnosed?  Your health care provider may suspect cirrhosis based on your symptoms and medical history, especially if you have other medical conditions or a history of alcohol abuse. Your health care provider will do a physical exam to feel your liver and check for signs of cirrhosis. Your health care provider may perform other tests, including:  · Blood tests to check:  ¨ Whether you have hepatitis B or C.  ¨ Kidney function.  ¨ Liver function.  · Imaging tests such as:  ¨ MRI or CT scan to look for changes seen  in advanced cirrhosis.  ¨ Ultrasound to see if normal liver tissue is being replaced by scar tissue.  · A procedure using a long needle to take a sample of liver tissue (biopsy) for examination under a microscope. Liver biopsy can confirm the diagnosis of cirrhosis.  How is this treated?  Treatment depends on how damaged your liver is and what caused the damage. Treatment may include treating cirrhosis symptoms or treating the underlying causes of the condition to try to slow the progression of the damage. Treatment may include:  · Making lifestyle changes, such as:  ¨ Eating a healthy diet.  ¨ Restricting salt intake.  ¨ Maintaining a healthy weight.  ¨ Not abusing drugs or alcohol.  · Taking medicines to:  ¨ Treat liver infections or other infections.  ¨ Control itching.  ¨ Reduce fluid buildup.  ¨ Reduce certain blood toxins.  ¨ Reduce risk of bleeding from enlarged blood vessels in the stomach or esophagus (varices).  · If varices are causing bleeding problems, you may need treatment with a procedure that ties up the vessels causing them to fall off (band ligation).  · If cirrhosis is causing your liver to fail, your health care provider may recommend a liver transplant.  · Other treatments may be recommended depending on any complications of cirrhosis, such as liver-related kidney failure (hepatorenal syndrome).  Follow these instructions at home:  · Take medicines only as directed by your health care provider. Do not use drugs that are toxic to your liver. Ask your health care provider before taking any new medicines, including over-the-counter medicines.  · Rest as needed.  · Eat a well-balanced diet. Ask your health care provider or dietitian for more information.  · You may have to follow a low-salt diet or restrict your water intake as directed.  · Do not drink alcohol. This is especially important if you are taking acetaminophen.  · Keep all follow-up visits as directed by your health care provider. This  is important.  Contact a health care provider if:  · You have fatigue or weakness that is getting worse.  · You develop swelling of the hands, feet, legs, or face.  · You have a fever.  · You develop loss of appetite.  · You have nausea or vomiting.  · You develop jaundice.  · You develop easy bruising or bleeding.  Get help right away if:  · You vomit bright red blood or a material that looks like coffee grounds.  · You have blood in your stools.  · Your stools appear black and tarry.  · You become confused.  · You have chest pain or trouble breathing.  This information is not intended to replace advice given to you by your health care provider. Make sure you discuss any questions you have with your health care provider.  Document Released: 12/18/2006 Document Revised: 04/27/2017 Document Reviewed: 08/26/2015  [employer] Patient Education © 2017 [employer]  Peritonitis  Introduction  Peritonitis is inflammation of the peritoneum. The peritoneum is the tissue that lines the abdomen and covers the internal organs. Certain conditions or injuries can cause organs to leak stool, bacteria, fungi, or chemicals, such as bile or other digestive fluids, into the abdomen. When these substances come into contact with the peritoneum, they may cause irritation or infection. Peritonitis can be a life-threatening infection if not treated promptly. The infection can progress to involve the whole body (sepsis).  What are the causes?  Many conditions can cause peritonitis, including:  · Appendicitis.  · Pancreatitis.  · Diverticulitis.  · Ulcers.  · Crohn disease or ulcerative colitis.  · Cancer.  · Liver disease.  · Tuberculosis.  Other possible causes are:  · Injury, such as:  ¨ Abdominal injury.  ¨ Injury to the stomach or esophagus.  · Other infections inside the abdomen or pelvis.  · Peritoneal dialysis. This is a procedure used to cleanse the blood when the kidneys are unable to do so.  What are the signs or  symptoms?  · Severe abdominal pain.  · Hard-feeling abdomen.  · Abdominal swelling.  · Fever and chills.  · Nausea and vomiting.  · Poor appetite or no appetite.  · Inability to pass gas or stool.  · Diarrhea.  · Less frequent urination.  How is this diagnosed?  Your health care provider will perform a physical exam and take your medical history. Other tests that are done may include:  · Blood tests.  · Urinalysis.  · Stool analysis, if you have associated diarrhea.  · Paracentesis.  · X-ray.  · Ultrasound.  · CT scans.  How is this treated?  Treatment for peritonitis requires treating the symptoms and also the underlying cause of peritonitis. Treatment may include:  · Antibiotic medicines.  · Surgery to remove infected fluid and tissue.  · Surgery to treat conditions that cause peritonitis, such as an appendectomy for appendicitis.  Follow these instructions at home:  · Take medicines only as directed by your health care provider.  · If you were prescribed an antibiotic medicine, finish it all even if you start to feel better.  · If you were taking prescription medicines for other problems before you developed peritonitis, ask about when you should re-start these medicines.  · Follow your health care provider's instructions for diet and activity.  · Drink enough fluid to keep your urine clear or pale yellow.  · If directed, use a stool softener or laxative.  · Rest as much as possible.  Contact a health care provider if:  You have a fever.  Get help right away if:  · You have severe abdominal pain or tenderness.  · You have abdominal swelling.  · You start to have the chills.  · You have new problems with passing urine.  · You develop chest pains or shortness of breath.  · You have nausea, vomiting, or diarrhea.  · You are unable to pass gas or stool.  This information is not intended to replace advice given to you by your health care provider. Make sure you discuss any questions you have with your health care  provider.  Document Released: 11/30/2009 Document Revised: 05/25/2017 Document Reviewed: 07/31/2015  © 2017 Elsevier  Cefdinir capsules  What is this medicine?  CEFDINIR (SEF di ner) is a cephalosporin antibiotic. It is used to treat certain kinds of bacterial infections. It will not work for colds, flu, or other viral infections.  This medicine may be used for other purposes; ask your health care provider or pharmacist if you have questions.  COMMON BRAND NAME(S): Myra  What should I tell my health care provider before I take this medicine?  They need to know if you have any of these conditions:  -bleeding problems  -kidney disease  -stomach or intestine problems (especially colitis)  -an unusual or allergic reaction to cefdinir, other cephalosporin antibiotics, penicillin, penicillamine, other foods, dyes or preservatives  -pregnant or trying to get pregnant  -breast-feeding  How should I use this medicine?  Take this medicine by mouth. Swallow it with a drink of water. Follow the directions on the prescription label. You can take it with or without food. If it upsets your stomach it may help to take it with food. Take your doses at regular intervals. Do not take it more often than directed. Finish all the medicine you are prescribed even if you think your infection is better.  Talk to your pediatrician regarding the use of this medicine in children. Special care may be needed.  Overdosage: If you think you have taken too much of this medicine contact a poison control center or emergency room at once.  NOTE: This medicine is only for you. Do not share this medicine with others.  What if I miss a dose?  If you miss a dose, take it as soon as you can. If it is almost time for your next dose, take only that dose. Do not take double or extra doses.  What may interact with this medicine?  -antacids that contain aluminum or magnesium  -iron supplements  -other antibiotics  -probenecid  This list may not describe all  possible interactions. Give your health care provider a list of all the medicines, herbs, non-prescription drugs, or dietary supplements you use. Also tell them if you smoke, drink alcohol, or use illegal drugs. Some items may interact with your medicine.  What should I watch for while using this medicine?  Tell your doctor or health care professional if your symptoms do not get better in a few days.  If you are diabetic you may get a false-positive result for sugar in your urine. Check with your doctor or health care professional before you change your diet or the dose of your diabetes medicine.  What side effects may I notice from receiving this medicine?  Side effects that you should report to your doctor or health care professional as soon as possible:  -allergic reactions like skin rash, itching or hives, swelling of the face, lips, or tongue  -bloody or watery diarrhea  -breathing problems  -fever  -redness, blistering, peeling or loosening of the skin, including inside the mouth  -seizures  -trouble passing urine or change in the amount of urine  -unusual bleeding or bruising  -unusually weak or tired  Side effects that usually do not require medical attention (report to your doctor or health care professional if they continue or are bothersome):  -constipation  -diarrhea  -dizziness  -dry mouth  -headache  -loss of appetite  -nausea, vomiting  -stomach pain  -stool discoloration  -tiredness  -vaginal discharge, itching, or odor in women  This list may not describe all possible side effects. Call your doctor for medical advice about side effects. You may report side effects to FDA at 9-436-FDA-9019.  Where should I keep my medicine?  Keep out of the reach of children.  Store at room temperature between 15 and 30 degrees C (59 and 86 degrees F). Throw the medicine away after the expiration date.  NOTE: This sheet is a summary. It may not cover all possible information. If you have questions about this  medicine, talk to your doctor, pharmacist, or health care provider.  © 2018 [employer] (2017-04-24 15:52:44)

## 2018-04-24 NOTE — CARE PLAN
Problem: Communication  Goal: The ability to communicate needs accurately and effectively will improve  Outcome: PROGRESSING AS EXPECTED  Pt is oriented to the unit and has been educated on the use of the call light, pt verbalizes understanding      Problem: Safety  Goal: Will remain free from injury  Outcome: PROGRESSING AS EXPECTED  Safety and fall precautions in place, bed in lowest position, treaded socks on, upper bedrails up.  Pt educated on call light and verbalizes understanding.

## 2018-04-24 NOTE — PROGRESS NOTES
"Patient is in room yelling profanities such as \"fuck you\" over the phone.  I informed patient that his language is unacceptable and will not be tolerated.  "

## 2018-04-24 NOTE — PROGRESS NOTES
Rec'd report from previous nurse regarding prior 12 hours.   White board updated.  Pt resting comfortably.  Bed locked in lowest position  Call light within reach.

## 2018-04-24 NOTE — PROGRESS NOTES
Patient is refusing all lab draws.  Explained importance of getting labs this morning after his procedures and albumin infusions yesterday, patient is still refusing.

## 2018-04-24 NOTE — DISCHARGE SUMMARY
HOSPITAL MEDICINE DISCHARGE SUMMARY    Name: Td Watt  MRN: [id number]  : 1975  Admit Date: 2018  Discharge Date: 2018  Attending Provider: Jose Porras M.D.  Admitting Provider: Dorian Wang M.D.  Primary Care Physician: No primary care provider on file.    DISCHARGE DIAGNOSES:   Principal Problem:    SBP (spontaneous bacterial peritonitis) (CMS-HCC) POA: Yes  Active Problems:    Nephrotic syndrome POA: Yes    Hypoalbuminemia POA: Yes    Anasarca POA: Yes    Methamphetamine abuse POA: Yes    Cirrhosis of liver with ascites (CMS-HCC) POA: Yes    Chronic hepatitis C without hepatic coma (CMS-HCC) POA: Yes    Uncontrolled hypertension POA: Yes  Resolved Problems:    Sepsis due to SBP (CMS-HCC) POA: Yes    Acute renal failure superimposed on stage 3 chronic kidney disease (CMS-HCC) POA: Yes      SUMMARY OF EVENTS LEADING TO ADMISSION:   43 y.o. Male with nephrotic syndrome, chronic active hepatitis C and liver cirrhosis, hypertension, admitted 2018 with SOB/edema.  He is here visiting his mother, lives in [place].       For further details of history of present illness, past medical/social/family histories, allergies and medication, please refer to copy of admission note by Dr. Dorian Wang M.D.    HOSPITAL COURSE:   The patient was admitted to the hospitalist service after being initially evaluated in the ED where he was felt to have sepsis, and possible SBP given new leukocytosis. He had a diagnostic paracentesis done, removing 200 mL of fluid, and he was started on empiric ceftriaxone. His creatinine was also worse than his baseline. He was tested positive for methamphetamine and urine drug screen, although he stated he quit 2 months ago. He underwent another paracentesis removing 6.7 L. Ultrasound of the gallbladder showed liver cirrhosis. His leukocytosis has improved. Ascitic fluid and blood cultures did not grow anything. Blood pressure remained  elevated, and his blood pressure medications were adjusted with addition of Norvasc, Aldactone and metoprolol. His creatinine returned to his baseline.    He did have persistent WBC elevation, which was felt to be related to being on prednisone. He remained hemodynamically stable and afebrile.    With  his clinical improvement, he was deemed ready to be discharged from the hospital as he did not have any further inpatient needs. Patient felt comfortable going home. The discharge plan was discussed with the patient, and he was agreeable to it.     The patient was subsequently sent home in improved and stable condition.     DISCHARGE DISPOSITION: recovered as expected.     FOLLOW-UP ISSUES:   * SBP (spontaneous bacterial peritonitis) (CMS-HCC)- (present on admission)   Assessment & Plan    - Ascitic fluid cultures remained negative. I will continue him on oral Omnicef to complete 7 days course empirically. Follow-up with PCP.        Nephrotic syndrome- (present on admission)   Assessment & Plan    - Stable. He will continue to follow-up with nephrology as outpatient in [place]. I will resume him on his home dose lisinopril. 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        Uncontrolled hypertension- (present on admission)   Assessment & Plan    - Continue Aldactone, metoprolol, Lasix, lisinopril, and Norvasc. Follow-up with PCP for continued outpatient blood pressure monitoring.        Chronic hepatitis C without hepatic coma (CMS-HCC)- (present on admission)   Assessment & Plan    - Follow up as outpatient.        Cirrhosis of liver with ascites (CMS-HCC)- (present on admission)   Assessment & Plan    - He will be continued on Lasix 80 mg daily, and Aldactone 100 mg daily. Counseled on maintaining low-salt hepatic diet. Follow-up with PCP.        Methamphetamine abuse- (present on admission)   Assessment & Plan    - counseled patient about cessation.        Anasarca- (present on admission)    Assessment & Plan    - he will be continued on aldactone and lasix. Follow-up with PCP.          DISCHARGE PHYSICAL EXAM:  Improved abdominal distention. No tenderness/guarding/rebound.   Improved leg edema.                 CHANGES IN MANAGEMENT OF CHRONIC CONDITION: As above.     PENDING TESTS: none    FOLLOW-UP TESTS ORDERED POST DISCHARGE: none    DISCHARGE MEDICATIONS:   Prior to Admission medications    Medication Sig Start Date End Date Taking? Authorizing Provider   metoprolol (LOPRESSOR) 25 MG Tab Take 1 Tab by mouth 2 Times a Day. 4/24/18  Yes Jose Porras M.D.   spironolactone (ALDACTONE) 100 MG Tab Take 1 Tab by mouth every day. 4/25/18  Yes Jose Porras M.D.   cefdinir (OMNICEF) 300 MG Cap Take 1 Cap by mouth 2 times a day for 7 days. 4/24/18 5/1/18 Yes Jose Porras M.D.   lisinopril (PRINIVIL) 20 MG Tab Take 1 Tab by mouth every morning. 4/21/18   MARIO Brandt.P.R.NLong   furosemide (LASIX) 80 MG Tab Take 1 Tab by mouth every morning. 4/21/18   MARIO Brandt.P.R.NLong   predniSONE (DELTASONE) 20 MG Tab Take 4 Tabs by mouth every morning. 4/21/18   Rosie Swenson A.P.R.N.          FOLLOW-UP APPOINTMENTS:   Your Primary Care Provider   LOKESH Duvall   Schedule an appointment as soon as possible for a visit  To establish with a primary care provider         For further details on discharge medications, patient education, diet, and activity, please refer to electronic copy of discharge instructions.       TIME SPENT: I spent 45 minutes, with greater than 50% of the time spent on face-to-face encounter, addressing medical issues, coordination of care, counseling, discharge planning, medication reconciliation, and documentation.

## 2018-04-25 ENCOUNTER — PATIENT OUTREACH (OUTPATIENT)
Dept: HEALTH INFORMATION MANAGEMENT | Facility: OTHER | Age: 43
End: 2018-04-25

## 2018-04-25 PROBLEM — Z72.0 TOBACCO ABUSE: Status: ACTIVE | Noted: 2018-04-25

## 2018-04-25 PROBLEM — B19.20 DECOMPENSATED HCV CIRRHOSIS (HCC): Status: ACTIVE | Noted: 2018-04-25

## 2018-04-25 PROBLEM — K74.69 DECOMPENSATED HCV CIRRHOSIS (HCC): Status: ACTIVE | Noted: 2018-04-25

## 2018-04-25 LAB
APPEARANCE FLD: NORMAL
BACTERIA FLD AEROBE CULT: NORMAL
BODY FLD TYPE: NORMAL
COLOR FLD: NORMAL
CSF COMMENTS 1658: NORMAL
EKG IMPRESSION: NORMAL
GRAM STN SPEC: NORMAL
GRAM STN SPEC: NORMAL
LYMPHOCYTES NFR FLD: 51 %
MESOTHL CELL NFR FLD: 4 %
MONONUC CELLS NFR FLD: 5 %
NEUTROPHILS NFR FLD: 40 %
RBC # FLD: <2000 CELLS/UL
SIGNIFICANT IND 70042: NORMAL
SIGNIFICANT IND 70042: NORMAL
SITE SITE: NORMAL
SITE SITE: NORMAL
SOURCE SOURCE: NORMAL
SOURCE SOURCE: NORMAL
WBC # FLD: 732 CELLS/UL

## 2018-04-25 PROCEDURE — 96375 TX/PRO/DX INJ NEW DRUG ADDON: CPT

## 2018-04-25 PROCEDURE — 94760 N-INVAS EAR/PLS OXIMETRY 1: CPT

## 2018-04-25 PROCEDURE — A9270 NON-COVERED ITEM OR SERVICE: HCPCS | Performed by: HOSPITALIST

## 2018-04-25 PROCEDURE — 700102 HCHG RX REV CODE 250 W/ 637 OVERRIDE(OP): Performed by: HOSPITALIST

## 2018-04-25 PROCEDURE — A9270 NON-COVERED ITEM OR SERVICE: HCPCS | Performed by: EMERGENCY MEDICINE

## 2018-04-25 PROCEDURE — 700102 HCHG RX REV CODE 250 W/ 637 OVERRIDE(OP): Performed by: EMERGENCY MEDICINE

## 2018-04-25 PROCEDURE — 770006 HCHG ROOM/CARE - MED/SURG/GYN SEMI*

## 2018-04-25 PROCEDURE — 700111 HCHG RX REV CODE 636 W/ 250 OVERRIDE (IP): Performed by: HOSPITALIST

## 2018-04-25 PROCEDURE — 700111 HCHG RX REV CODE 636 W/ 250 OVERRIDE (IP): Performed by: EMERGENCY MEDICINE

## 2018-04-25 PROCEDURE — 99232 SBSQ HOSP IP/OBS MODERATE 35: CPT | Performed by: HOSPITALIST

## 2018-04-25 PROCEDURE — 96366 THER/PROPH/DIAG IV INF ADDON: CPT

## 2018-04-25 PROCEDURE — 96365 THER/PROPH/DIAG IV INF INIT: CPT

## 2018-04-25 PROCEDURE — P9047 ALBUMIN (HUMAN), 25%, 50ML: HCPCS | Performed by: EMERGENCY MEDICINE

## 2018-04-25 RX ORDER — NICOTINE 21 MG/24HR
21 PATCH, TRANSDERMAL 24 HOURS TRANSDERMAL
Status: DISCONTINUED | OUTPATIENT
Start: 2018-04-25 | End: 2018-04-26 | Stop reason: HOSPADM

## 2018-04-25 RX ORDER — ONDANSETRON 4 MG/1
4 TABLET, ORALLY DISINTEGRATING ORAL EVERY 4 HOURS PRN
Status: DISCONTINUED | OUTPATIENT
Start: 2018-04-25 | End: 2018-04-26 | Stop reason: HOSPADM

## 2018-04-25 RX ORDER — ONDANSETRON 2 MG/ML
4 INJECTION INTRAMUSCULAR; INTRAVENOUS EVERY 4 HOURS PRN
Status: DISCONTINUED | OUTPATIENT
Start: 2018-04-25 | End: 2018-04-26 | Stop reason: HOSPADM

## 2018-04-25 RX ORDER — BISACODYL 10 MG
10 SUPPOSITORY, RECTAL RECTAL
Status: DISCONTINUED | OUTPATIENT
Start: 2018-04-25 | End: 2018-04-26 | Stop reason: HOSPADM

## 2018-04-25 RX ORDER — PROMETHAZINE HYDROCHLORIDE 25 MG/1
12.5-25 SUPPOSITORY RECTAL EVERY 4 HOURS PRN
Status: DISCONTINUED | OUTPATIENT
Start: 2018-04-25 | End: 2018-04-26 | Stop reason: HOSPADM

## 2018-04-25 RX ORDER — PROMETHAZINE HYDROCHLORIDE 25 MG/1
12.5-25 TABLET ORAL EVERY 4 HOURS PRN
Status: DISCONTINUED | OUTPATIENT
Start: 2018-04-25 | End: 2018-04-26 | Stop reason: HOSPADM

## 2018-04-25 RX ORDER — SPIRONOLACTONE 100 MG/1
100 TABLET, FILM COATED ORAL DAILY
Status: DISCONTINUED | OUTPATIENT
Start: 2018-04-25 | End: 2018-04-26 | Stop reason: HOSPADM

## 2018-04-25 RX ORDER — POLYETHYLENE GLYCOL 3350 17 G/17G
1 POWDER, FOR SOLUTION ORAL
Status: DISCONTINUED | OUTPATIENT
Start: 2018-04-25 | End: 2018-04-26 | Stop reason: HOSPADM

## 2018-04-25 RX ORDER — HYDROCODONE BITARTRATE AND ACETAMINOPHEN 5; 325 MG/1; MG/1
1 TABLET ORAL ONCE
Status: COMPLETED | OUTPATIENT
Start: 2018-04-25 | End: 2018-04-25

## 2018-04-25 RX ORDER — FUROSEMIDE 20 MG/1
80 TABLET ORAL EVERY MORNING
Status: DISCONTINUED | OUTPATIENT
Start: 2018-04-25 | End: 2018-04-26 | Stop reason: HOSPADM

## 2018-04-25 RX ORDER — LISINOPRIL 20 MG/1
20 TABLET ORAL EVERY MORNING
Status: DISCONTINUED | OUTPATIENT
Start: 2018-04-25 | End: 2018-04-26 | Stop reason: HOSPADM

## 2018-04-25 RX ORDER — OXYCODONE HYDROCHLORIDE 5 MG/1
5 TABLET ORAL EVERY 4 HOURS PRN
Status: DISCONTINUED | OUTPATIENT
Start: 2018-04-25 | End: 2018-04-26 | Stop reason: HOSPADM

## 2018-04-25 RX ORDER — PREDNISONE 20 MG/1
80 TABLET ORAL EVERY MORNING
Status: DISCONTINUED | OUTPATIENT
Start: 2018-04-25 | End: 2018-04-26 | Stop reason: HOSPADM

## 2018-04-25 RX ORDER — AMOXICILLIN 250 MG
2 CAPSULE ORAL 2 TIMES DAILY
Status: DISCONTINUED | OUTPATIENT
Start: 2018-04-25 | End: 2018-04-26 | Stop reason: HOSPADM

## 2018-04-25 RX ADMIN — OXYCODONE HYDROCHLORIDE 5 MG: 5 TABLET ORAL at 12:59

## 2018-04-25 RX ADMIN — STANDARDIZED SENNA CONCENTRATE AND DOCUSATE SODIUM 2 TABLET: 8.6; 5 TABLET, FILM COATED ORAL at 20:31

## 2018-04-25 RX ADMIN — HYDROCODONE BITARTRATE AND ACETAMINOPHEN 1 TABLET: 5; 325 TABLET ORAL at 00:26

## 2018-04-25 RX ADMIN — SPIRONOLACTONE 100 MG: 100 TABLET, FILM COATED ORAL at 07:35

## 2018-04-25 RX ADMIN — ALBUMIN (HUMAN) 25 G: 12.5 SOLUTION INTRAVENOUS at 00:21

## 2018-04-25 RX ADMIN — CEFTRIAXONE 2 G: 2 INJECTION, POWDER, FOR SOLUTION INTRAMUSCULAR; INTRAVENOUS at 05:50

## 2018-04-25 RX ADMIN — FUROSEMIDE 80 MG: 20 TABLET ORAL at 07:35

## 2018-04-25 RX ADMIN — CEFTRIAXONE SODIUM 2 G: 2 INJECTION, POWDER, FOR SOLUTION INTRAMUSCULAR; INTRAVENOUS at 00:22

## 2018-04-25 RX ADMIN — METOPROLOL TARTRATE 25 MG: 25 TABLET, FILM COATED ORAL at 20:30

## 2018-04-25 RX ADMIN — METOPROLOL TARTRATE 25 MG: 25 TABLET, FILM COATED ORAL at 08:47

## 2018-04-25 RX ADMIN — METOPROLOL TARTRATE 25 MG: 25 TABLET, FILM COATED ORAL at 00:30

## 2018-04-25 RX ADMIN — OXYCODONE HYDROCHLORIDE 5 MG: 5 TABLET ORAL at 20:31

## 2018-04-25 RX ADMIN — LISINOPRIL 20 MG: 20 TABLET ORAL at 08:46

## 2018-04-25 RX ADMIN — PREDNISONE 80 MG: 20 TABLET ORAL at 07:35

## 2018-04-25 RX ADMIN — STANDARDIZED SENNA CONCENTRATE AND DOCUSATE SODIUM 2 TABLET: 8.6; 5 TABLET, FILM COATED ORAL at 00:30

## 2018-04-25 ASSESSMENT — ENCOUNTER SYMPTOMS
FLANK PAIN: 0
ABDOMINAL PAIN: 1
NECK PAIN: 0
ROS GI COMMENTS: ABDOMINAL SWELLING
DIARRHEA: 0
HALLUCINATIONS: 0
SENSORY CHANGE: 0
BACK PAIN: 0
SHORTNESS OF BREATH: 0
PALPITATIONS: 0
SHORTNESS OF BREATH: 1
SPEECH CHANGE: 0
CHILLS: 0
TINGLING: 0
WEAKNESS: 0
SORE THROAT: 0
HEADACHES: 0
MYALGIAS: 0
VOMITING: 0
FEVER: 0
FOCAL WEAKNESS: 0
EYE DISCHARGE: 0
TREMORS: 0
EYE REDNESS: 0
DEPRESSION: 0
NAUSEA: 0
WHEEZING: 0
DIAPHORESIS: 0
PHOTOPHOBIA: 0
DIZZINESS: 0
STRIDOR: 0
COUGH: 0

## 2018-04-25 ASSESSMENT — PAIN SCALES - GENERAL
PAINLEVEL_OUTOF10: 8
PAINLEVEL_OUTOF10: 4
PAINLEVEL_OUTOF10: 2
PAINLEVEL_OUTOF10: 10
PAINLEVEL_OUTOF10: 5

## 2018-04-25 ASSESSMENT — LIFESTYLE VARIABLES
EVER_SMOKED: YES
SUBSTANCE_ABUSE: 0

## 2018-04-25 ASSESSMENT — COPD QUESTIONNAIRES
HAVE YOU SMOKED AT LEAST 100 CIGARETTES IN YOUR ENTIRE LIFE: YES
COPD SCREENING SCORE: 4
DURING THE PAST 4 WEEKS HOW MUCH DID YOU FEEL SHORT OF BREATH: SOME OF THE TIME
DO YOU EVER COUGH UP ANY MUCUS OR PHLEGM?: NO/ONLY WITH OCCASIONAL COLDS OR INFECTIONS

## 2018-04-25 NOTE — ASSESSMENT & PLAN NOTE
Peritoneal fluid PMN s 292 consistent SBP   Fu cultures  Continue IV rocephin  Add ultram as needed for pain control

## 2018-04-25 NOTE — CARE PLAN
Problem: Communication  Goal: The ability to communicate needs accurately and effectively will improve  Patient willing to listen and communicate well, however patient adamant on doing opposite of what care team communicates to him (not willing to be compliant with fluid restriction or hepatic diet).     Problem: Pain Management  Goal: Pain level will decrease to patient's comfort goal  Patient stating pain is above a 10/10 despite resting calmly, and not appearing in distress. Small dosage of oxycodone added to MAR as needed.

## 2018-04-25 NOTE — PROGRESS NOTES
[facility] Hospitalist Progress Note    Date of Service: 2018    Chief Complaint  43 y.o. male hx of hep C, cirrhosis, nephrotic syndrome on prednisone recently discharged 18 following 7 L paracentesis re admitted same day with increased abd distention and pain.  Post 5 L paracentesis in ER.     Interval Problem Update  Co abd pain.  Has been non compliant with fluid restriction according to RN   No N/V. Renal function worsened. Peritoneal fluid consistent with SBP.  States he was unable to  medications due script coverage only in [place]. States he will move back to [place] on Saturday .    Consultants/Specialty  None     Disposition  Anticipate dc home when stable, SS to work on medication coverage         Review of Systems   Constitutional: Negative for chills, diaphoresis, fever and malaise/fatigue.   HENT: Negative for congestion.    Eyes: Negative for discharge and redness.   Respiratory: Negative for cough, shortness of breath, wheezing and stridor.    Cardiovascular: Negative for chest pain, palpitations and leg swelling.   Gastrointestinal: Positive for abdominal pain. Negative for diarrhea and vomiting.   Genitourinary: Negative for flank pain and hematuria.   Musculoskeletal: Negative for back pain, joint pain and neck pain.   Skin: Negative for itching and rash.   Neurological: Negative for tremors, sensory change, speech change, focal weakness and weakness.   Psychiatric/Behavioral: Negative for hallucinations and substance abuse.      Physical Exam  Laboratory/Imaging   Hemodynamics  Temp (24hrs), Av.1 °C (98.8 °F), Min:36.7 °C (98 °F), Max:37.7 °C (99.8 °F)   Temperature: 36.8 °C (98.3 °F)  Pulse  Av.5  Min: 70  Max: 122    Blood Pressure: 149/90 (Rn notified), NIBP: 143/84      Respiratory      Respiration: 18, Pulse Oximetry: 98 %, O2 Daily Delivery Respiratory : Room Air with O2 Available     Work Of Breathing / Effort: Mild  RUL Breath Sounds: Clear, RML Breath Sounds:  Clear, RLL Breath Sounds: Diminished, HENRRY Breath Sounds: Clear, LLL Breath Sounds: Diminished    Fluids    Intake/Output Summary (Last 24 hours) at 04/25/18 0959  Last data filed at 04/25/18 0810   Gross per 24 hour   Intake              240 ml   Output             1100 ml   Net             -860 ml       Nutrition  Orders Placed This Encounter   Procedures   • Diet Order     Standing Status:   Standing     Number of Occurrences:   1     Order Specific Question:   Diet:     Answer:   Hepatic [9]     Order Specific Question:   Consistency/Fluid modifications:     Answer:   1000 ml Fluid Restriction [7]     Physical Exam   Constitutional: He is oriented to person, place, and time. No distress.   HENT:   Head: Normocephalic and atraumatic.   Nose: Nose normal.   Eyes: Conjunctivae and EOM are normal. No scleral icterus.   Neck: Normal range of motion. No JVD present.   Cardiovascular: Normal rate and regular rhythm.    No murmur heard.  Pulmonary/Chest: Effort normal. No stridor. No respiratory distress. He has no wheezes. He has no rales.   Abdominal: Soft. Bowel sounds are normal. He exhibits distension (mod fluid wave ). There is tenderness. There is no rebound and no guarding.   Musculoskeletal: He exhibits no edema or tenderness.   Neurological: He is alert and oriented to person, place, and time. No cranial nerve deficit.   Skin: Skin is warm and dry. He is not diaphoretic. No pallor.   Psychiatric: He has a normal mood and affect. His behavior is normal.   Vitals reviewed.      Recent Labs      04/23/18   0401  04/24/18   0419  04/24/18   1905   WBC  15.1*  16.0*  11.4*   RBC  3.77*  3.83*  4.42*   HEMOGLOBIN  10.6*  10.6*  12.3*   HEMATOCRIT  33.8*  32.7*  38.5*   MCV  89.7  85.4  87.1   MCH  28.1  27.7  27.8   MCHC  31.4*  32.4*  31.9*   RDW  50.8*  47.0  48.7   PLATELETCT  530*  522*  689*   MPV  9.0  9.0  8.8*     Recent Labs      04/23/18   0401  04/24/18   0420  04/24/18   1905   SODIUM  133*  136  138    POTASSIUM  4.5  4.3  4.7   CHLORIDE  112  112  112   CO2  14*  16*  16*   GLUCOSE  117*  92  160*   BUN  45*  44*  51*   CREATININE  2.14*  1.63*  1.93*   CALCIUM  7.6*  7.2*  8.0*                      Assessment/Plan     * Decompensated HCV cirrhosis (CMS-HCC)   Assessment & Plan    Patient is a known history of cirrhosis in the setting of HCV w recurrent massive ascites - post recent 7 L paracentesis and 5 L on 4-24-18.    cw lasix, aldactone-- fu renal fxn, electrolytes  Monitor Diuretic response- adjust diuretics if rapid increase in ascites  Encourage compliance with fluid restriction.         SBP (spontaneous bacterial peritonitis) (CMS-HCC)- (present on admission)   Assessment & Plan    Peritoneal fluid PMN s 292 consistent SBP   Fu cultures  Continue IV rocephin  Add ultram as needed for pain control          Nephrotic syndrome- (present on admission)   Assessment & Plan    Hx of Minimal change disease  Continue steroids.   Fu renal function.         Fluid overload- (present on admission)   Assessment & Plan    Treatment as noted above.        Tobacco abuse   Assessment & Plan    Tobacco cession given  Trial of nicotine patch.           Quality-Core Measures   Reviewed items::  Medications reviewed, Labs reviewed and Radiology images reviewed  Piedra catheter::  No Piedra  DVT prophylaxis - mechanical:  SCDs  Antibiotics:  Treating active infection/contamination beyond 24 hours perioperative coverage

## 2018-04-25 NOTE — PROGRESS NOTES
"Assumed care of patient. Patient sleeping, but easily awoken. Patient stating pain is a 12/10. Will mention to MD since there are no medications on MAR to administer. Patient states \"they're being stingy with the pain medications this stay\". Abdomen is distended, but soft. Bilateral abdominal puncture marks from past two paracentesis. Patient reminded of fluid restriction to which he stated \"If I'm thirsty, I'll just walk to the sink and drink some water. I'm not going to follow that\". Patient highly educated on reasoning for fluid restriction. No other needs at this time. Call light within reach.   "

## 2018-04-25 NOTE — ED TRIAGE NOTES
Pt states was just dc'd from inpt 2 hours ago, had paracentesis. Pt has dc instructions and states has all symptoms listed that tell pt to return.

## 2018-04-25 NOTE — ED TRIAGE NOTES
Pt c/o abd pain, tightness around neck and jaw, chills, nausea, sob,abd swelling, states has infection in abd. Pt speaking in complete sentences, no distress noted. Pt states has been admitted twice in last two weeks.

## 2018-04-25 NOTE — ED PROVIDER NOTES
"ED Provider Note    ED Provider Note      Primary care provider: No primary care provider on file.    CHIEF COMPLAINT  Chief Complaint   Patient presents with   • Abdominal Pain       HPI  Td Watt is a 43 y.o. male who presents to the Emergency Department with chief complaint of abdominal pain and swelling. Patient discharges morning after admission for spontaneous bacterial peritonitis since discharge his abdomen is continued to swell he has tenderness throughout his abdomen he's had subjective chills without measured fever minor nausea slight tightness in his chest. He states that he's also noticed some redness at previous paracentesis site. Nauseous without vomiting no constipation or diarrhea no urinary complaints this time. Pain is rated as moderate without alleviating or aggravating factors.    REVIEW OF SYSTEMS  10 systems reviewed and otherwise negative, pertinent positives and negatives listed in the history of present illness.    PAST MEDICAL HISTORY   has a past medical history of Congestive heart failure (CMS-HCC); Hepatitis C; Hypertension; and Liver cirrhosis (CMS-HCC).    SURGICAL HISTORY  patient denies any surgical history    SOCIAL HISTORY  Social History   Substance Use Topics   • Smoking status: Current Every Day Smoker   • Smokeless tobacco: Never Used   • Alcohol use Yes      History   Drug Use     Comment: marijuana       FAMILY HISTORY  Non-Contributory    CURRENT MEDICATIONS  Home Medications    **Home medications have not yet been reviewed for this encounter**         ALLERGIES  No Known Allergies    PHYSICAL EXAM  VITAL SIGNS: BP (!) 166/105   Pulse (!) 106   Temp 37.7 °C (99.8 °F)   Resp 18   Ht 1.854 m (6' 1\")   Wt 103.6 kg (228 lb 6.3 oz)   SpO2 97%   BMI 30.13 kg/m²   Pulse ox interpretation: I interpret this pulse ox as normal.  Constitutional: Alert and oriented x 3, moderate Distress  HEENT: Atraumatic normocephalic, pupils are equal round reactive to light extraocular " movements are intact. The nares is clear, external ears are normal, mouth shows moist mucous membranes  Neck: Supple, no JVD no tracheal deviation  Cardiovascular: Tachycardic no murmur rub or gallop 2+ pulses peripherally x4  Thorax & Lungs: No respiratory distress, no wheezes rales or rhonchi, No chest tenderness.   GI: Distended, distant bowel sounds, positive fluid wave, slight erythema over the left lower quadrant.  Skin: Warm dry no acute rash or lesion  Musculoskeletal: Moving all extremities with full range and 5 of 5 strength, no acute  deformity  Neurologic: Cranial nerves III through XII are grossly intact, no sensory deficit, no cerebellar dysfunction   Psychiatric: Appropriate affect for situation at this time      DIAGNOSTIC STUDIES / PROCEDURES  LABS      Results for orders placed or performed during the hospital encounter of 04/24/18   CBC WITH DIFFERENTIAL   Result Value Ref Range    WBC 11.4 (H) 4.8 - 10.8 K/uL    RBC 4.42 (L) 4.70 - 6.10 M/uL    Hemoglobin 12.3 (L) 14.0 - 18.0 g/dL    Hematocrit 38.5 (L) 42.0 - 52.0 %    MCV 87.1 81.4 - 97.8 fL    MCH 27.8 27.0 - 33.0 pg    MCHC 31.9 (L) 33.7 - 35.3 g/dL    RDW 48.7 35.9 - 50.0 fL    Platelet Count 689 (H) 164 - 446 K/uL    MPV 8.8 (L) 9.0 - 12.9 fL    Neutrophils-Polys 79.40 (H) 44.00 - 72.00 %    Lymphocytes 16.10 (L) 22.00 - 41.00 %    Monocytes 3.90 0.00 - 13.40 %    Eosinophils 0.00 0.00 - 6.90 %    Basophils 0.10 0.00 - 1.80 %    Immature Granulocytes 0.50 0.00 - 0.90 %    Nucleated RBC 0.00 /100 WBC    Neutrophils (Absolute) 9.04 (H) 1.82 - 7.42 K/uL    Lymphs (Absolute) 1.83 1.00 - 4.80 K/uL    Monos (Absolute) 0.44 0.00 - 0.85 K/uL    Eos (Absolute) 0.00 0.00 - 0.51 K/uL    Baso (Absolute) 0.01 0.00 - 0.12 K/uL    Immature Granulocytes (abs) 0.06 0.00 - 0.11 K/uL    NRBC (Absolute) 0.00 K/uL   COMP METABOLIC PANEL   Result Value Ref Range    Sodium 138 135 - 145 mmol/L    Potassium 4.7 3.6 - 5.5 mmol/L    Chloride 112 96 - 112 mmol/L     Co2 16 (L) 20 - 33 mmol/L    Anion Gap 10.0 0.0 - 11.9    Glucose 160 (H) 65 - 99 mg/dL    Bun 51 (H) 8 - 22 mg/dL    Creatinine 1.93 (H) 0.50 - 1.40 mg/dL    Calcium 8.0 (L) 8.5 - 10.5 mg/dL    AST(SGOT) 95 (H) 12 - 45 U/L    ALT(SGPT) 102 (H) 2 - 50 U/L    Alkaline Phosphatase 240 (H) 30 - 99 U/L    Total Bilirubin 0.1 0.1 - 1.5 mg/dL    Albumin 2.0 (L) 3.2 - 4.9 g/dL    Total Protein 5.8 (L) 6.0 - 8.2 g/dL    Globulin 3.8 (H) 1.9 - 3.5 g/dL    A-G Ratio 0.5 g/dL   LIPASE   Result Value Ref Range    Lipase 36 11 - 82 U/L   ESTIMATED GFR   Result Value Ref Range    GFR If  46 (A) >60 mL/min/1.73 m 2    GFR If Non  38 (A) >60 mL/min/1.73 m 2   BLOOD CULTURE,HOLD   Result Value Ref Range    Blood Culture Hold Collected    FLUID CELL COUNT   Result Value Ref Range    Fluid Type Peritoneal     Color-Body Fluid White     Character-Body Fluid Hazy     Total RBC Count <2000 cells/uL    Total  cells/uL    Polys 40 %    Lymphs 51 %    Mononuclear Cells - Fluid 5 %    Mesothelial Cells - CSF 4 %    Comments see below        All labs reviewed by me.        RADIOLOGY  No orders to display     The radiologist's interpretation of all radiological studies have been reviewed by me.    COURSE & MEDICAL DECISION MAKING  Pertinent Labs & Imaging studies reviewed. (See chart for details)    10:25 PM - Patient seen and examined at bedside.         Patient noted to have slightly elevated blood pressure likely circumstantial secondary to presenting complaint. Referred to primary care physician for further evaluation.    Paracentesis Procedure Note    Indication: Ascites, concern for spontaneous bacterial peritonitis     Consent: The patient provided verbal consent for this procedure.    Procedure: The patient was placed in the supine position with the head of the bed slightly elevated and the appropriate landmarks were identified. The skin over the puncture site in the right lower quadrant region  "was prepped with betadine and draped in a sterile fashion. Local anesthesia was obtained by infiltration using 1% Lidocaine without epinephrine. A paracentesis catheter was then advanced into the abdominal cavity over a needle and the needle was withdrawn. Fluid was returned which was cloudy.  A total volume of 5000 was withdrawn which was sent to the lab for appropriate testing. The catheter was then withdrawn and a sterile dressing was placed over the site.     The patient tolerated the procedure well.    Complications: None        Medical Decision Makin-year-old male just discharged from the hospital earlier this morning presents with increasing abdominal distention pain subjective chills and nausea. Patient was being treated for spontaneous bacterial peritonitis unable to feel antibiotics and outpatient basis due to insurance constraints. Reports that he's had multiple symptoms consistent with his return precautions and is concerned that his infection and his abdomen is re-flaring. Is vital signs of improved throughout his emergency department stay and did perform paracentesis which revealed cloudy fluid which is somewhat concerning for spontaneous bacterial peritonitis sent for appropriate testing pending at this time. After paracentesis completed patient given dose of Rocephin discussed case with hospice Dr. Dolan who admitted the patient in guarded condition help with this patient's greatly appreciated    /71   Pulse 74   Temp 37.3 °C (99.2 °F)   Resp 17   Ht 1.854 m (6' 1\")   Wt 103.6 kg (228 lb 6.3 oz)   SpO2 97%   BMI 30.13 kg/m²     FINAL IMPRESSION  1. Spontaneous bacterial peritonitis (CMS-HCC)    2. Abdominal pain  3. History of cirrhosis  4. History of nephrotic syndrome  5. Paracentesis performed by ER physician      This dictation has been created using voice recognition software and/or scribes. The accuracy of the dictation is limited by the abilities of the software and the " expertise of the scribes. I expect there may be some errors of grammar and possibly content. I made every attempt to manually correct the errors within my dictation. However, errors related to voice recognition software and/or scribes may still exist and should be interpreted within the appropriate context.

## 2018-04-25 NOTE — H&P
Hospital Medicine History and Physical    Date of Service  4/24/2018    Chief Complaint  Chief Complaint   Patient presents with   • Abdominal Pain       History of Presenting Illness  43 y.o. male who presented on 4/24/2018 with abdominal pain and swelling. Patient has a known history of cirrhosis secondary to HCV.  He was just discharged from our hospital yesterday for spontaneous bacterial peritonitis in the setting of decompensated liver cirrhosis with anasarca and hypoalbuminemia. The day of his discharge, he had undergone a 7 L thoracentesis. Despite this, the patient states that within hours of his discharge, he had immediate return of abdominal pain and swelling. Additionally, he was unable to  any of his prescriptions that were given to him as he has no insurance coverage in [place]. He reports no fevers, but has had chills, nausea, abdominal pain, and continue swelling in his legs and arms. Upon assessment in the emergency room, patient was noted to have large distended abdomen with a fluid thrill and underwent thoracentesis with 5 L removed. Fluid remains cloudy.      Primary Care Physician  No primary care provider on file.    Consultants  None    Code Status  Full    Review of Systems  Review of Systems   Constitutional: Negative for chills and fever.   HENT: Negative for congestion and sore throat.    Eyes: Negative for photophobia.   Respiratory: Positive for shortness of breath. Negative for cough and wheezing.    Cardiovascular: Positive for leg swelling. Negative for chest pain and palpitations.   Gastrointestinal: Positive for abdominal pain. Negative for diarrhea, nausea and vomiting.        Abdominal swelling   Genitourinary: Negative for dysuria.   Musculoskeletal: Negative for myalgias.   Skin: Negative.    Neurological: Negative for dizziness, tingling, focal weakness and headaches.   Psychiatric/Behavioral: Negative for depression and suicidal ideas.        Past Medical History  Past  Medical History:   Diagnosis Date   • Congestive heart failure (CMS-HCC)    • Hepatitis C    • Hypertension    • Liver cirrhosis (CMS-HCC)        Surgical History  No past surgical history on file.    Medications  No current facility-administered medications on file prior to encounter.      Current Outpatient Prescriptions on File Prior to Encounter   Medication Sig Dispense Refill   • metoprolol (LOPRESSOR) 25 MG Tab Take 1 Tab by mouth 2 Times a Day. 60 Tab 2   • [START ON 2018] spironolactone (ALDACTONE) 100 MG Tab Take 1 Tab by mouth every day. 30 Tab 3   • cefdinir (OMNICEF) 300 MG Cap Take 1 Cap by mouth 2 times a day for 7 days. 14 Cap 0   • lisinopril (PRINIVIL) 20 MG Tab Take 1 Tab by mouth every morning. 30 Tab 0   • furosemide (LASIX) 80 MG Tab Take 1 Tab by mouth every morning. 30 Tab 0   • predniSONE (DELTASONE) 20 MG Tab Take 4 Tabs by mouth every morning. 120 Tab 0       Family History  History reviewed. No pertinent family history.    Social History  Social History   Substance Use Topics   • Smoking status: Current Every Day Smoker   • Smokeless tobacco: Never Used   • Alcohol use Yes       Allergies  No Known Allergies     Physical Exam  Laboratory   Hemodynamics  Temp (24hrs), Av.7 °C (99.8 °F), Min:37.7 °C (99.8 °F), Max:37.7 °C (99.8 °F)   Temperature: 37.7 °C (99.8 °F)  Pulse  Av  Min: 93  Max: 122    Blood Pressure: (!) 166/105      Respiratory      Respiration: 18, Pulse Oximetry: 98 %             Physical Exam   Constitutional: He is oriented to person, place, and time. No distress.   Chronically ill-appearing   HENT:   Head: Normocephalic and atraumatic.   Right Ear: External ear normal.   Left Ear: External ear normal.   Eyes: EOM are normal. Right eye exhibits no discharge. Left eye exhibits no discharge.   Neck: Neck supple. No JVD present.   Cardiovascular: Normal rate, regular rhythm and normal heart sounds.    Pulmonary/Chest: Effort normal and breath sounds normal. No  respiratory distress. He exhibits no tenderness.   Abdominal: Soft. Bowel sounds are normal. He exhibits distension. There is no tenderness.   Musculoskeletal: He exhibits edema.   Neurological: He is alert and oriented to person, place, and time. No cranial nerve deficit.   Skin: Skin is dry. He is not diaphoretic. No erythema.   Psychiatric: He has a normal mood and affect. His behavior is normal.   Nursing note and vitals reviewed.    Capillary refill less than 3 seconds, distal pulses intact    Recent Labs      04/23/18   0401  04/24/18   0419  04/24/18   1905   WBC  15.1*  16.0*  11.4*   RBC  3.77*  3.83*  4.42*   HEMOGLOBIN  10.6*  10.6*  12.3*   HEMATOCRIT  33.8*  32.7*  38.5*   MCV  89.7  85.4  87.1   MCH  28.1  27.7  27.8   MCHC  31.4*  32.4*  31.9*   RDW  50.8*  47.0  48.7   PLATELETCT  530*  522*  689*   MPV  9.0  9.0  8.8*     Recent Labs      04/23/18   0401  04/24/18   0420  04/24/18   1905   SODIUM  133*  136  138   POTASSIUM  4.5  4.3  4.7   CHLORIDE  112  112  112   CO2  14*  16*  16*   GLUCOSE  117*  92  160*   BUN  45*  44*  51*   CREATININE  2.14*  1.63*  1.93*   CALCIUM  7.6*  7.2*  8.0*     Recent Labs      04/23/18   0401  04/24/18   0420  04/24/18   1905   ALTSGPT   --   22  102*   ASTSGOT   --   29  95*   ALKPHOSPHAT   --   135*  240*   TBILIRUBIN   --   0.1  0.1   LIPASE   --    --   36   GLUCOSE  117*  92  160*                 Lab Results   Component Value Date    TROPONINI 0.01 04/20/2018       Imaging  Dx-chest-limited (1 View)    Result Date: 4/20/2018 4/20/2018 3:29 PM HISTORY/REASON FOR EXAM:  Chest Pain Shortness of breath, cough TECHNIQUE/EXAM DESCRIPTION AND NUMBER OF VIEWS: Single portable view of the chest. COMPARISON: None available. FINDINGS: Cardiomediastinal contour is within normal limits. No focal pulmonary consolidation. No pleural fluid collection or pneumothorax. No major bony abnormality is seen.     No acute cardiopulmonary disease.    Dx-chest-portable (1  View)    Result Date: 4/21/2018 4/21/2018 10:46 PM HISTORY/REASON FOR EXAM:  Chest Pain TECHNIQUE/EXAM DESCRIPTION AND NUMBER OF VIEWS: Single portable view of the chest. COMPARISON: 4/20/2018 FINDINGS: HEART: Stable size. LUNGS: No areas of air space disease are demonstrated. PLEURA: No effusion or pneumothorax.     No acute cardiac or pulmonary abnormalities are identified.    Us-liver And Biliary Tree    Result Date: 4/23/2018 4/23/2018 6:42 AM HISTORY/REASON FOR EXAM:  Distended Abdomen Abdominal pain TECHNIQUE/EXAM DESCRIPTION AND NUMBER OF VIEWS:  Real-time sonography of the liver and biliary tree. COMPARISON: None FINDINGS: The liver is heterogeneously hyperechoic and nodular. No focal mass identified. The liver measures 18.75 cm. Multiple shadowing stones are located within the gallbladder. A 5.7 mm intraluminal nodular structure is consistent with gallbladder polyp. The gallbladder wall thickness measures 2.9 mm. There is no pericholecystic fluid. The common duct measures 5.7 mm. The visualized pancreas is unremarkable. The visualized aorta is normal in caliber. Intrahepatic IVC is patent. The portal vein is patent with hepatopetal flow. Main portal vein measures 18.5 mm in diameter The right kidney measures 13.6 cm. No right hydronephrosis There is moderate ascites.     Hepatomegaly and findings consistent with cirrhosis. No focal mass identified. Moderate ascites. Dilated main portal vein measuring 18.5 mm. Flow is hepatopetal. Cholelithiasis. No gallbladder wall thickening or dilatation of the common duct. 5.7 mm gallbladder polyp.    Us-paracentesis, Abd With Imaging    Result Date: 4/23/2018 4/23/2018 8:38 AM HISTORY/REASON FOR EXAM:  Ascites TECHNIQUE/EXAM DESCRIPTION: Ultrasound-guided paracentesis. COMPARISON:  None PROCEDURE:  Informed consent was obtained. A timeout was taken. Ascites was localized with real-time ultrasound. The left lower quadrant of the abdominal wall was prepared and draped  in a sterile manner. Following local anesthesia with 1% lidocaine, a 5 Greek Yueh pigtail catheter was advanced into the peritoneal cavity with trocar technique. Ascites was drained. The patient tolerated the procedure well with no evidence of complication. FINDINGS: Ascites is present. Fluid was not sent to the laboratory. Fluid character: straw colored     1. Ultrasound-guided therapeutic paracentesis of the left lower quadrant of the abdominal wall. 2. 6700 mL of fluid withdrawn.    Echocardiogram Comp W/o Cont    Result Date: 2018  Transthoracic Echo Report Echocardiography Laboratory CONCLUSIONS No prior study is available for comparison. Left ventricular ejection fraction is visually estimated to be 65%. Normal inferior vena cava size and inspiratory collapse. No significant valve disease or flow abnormalities. Normal transthoracic echocardiogram. JAMAL WHITE Exam Date:         2018                    19:04 Exam Location:     Inpatient Priority:          Routine Ordering Physician:        SKINNY PARTIDA Referring Physician: Sonographer:               Mallory Wick RDCS Age:    43     Gender:    M MRN:    [id number] :    1975 BSA:    2.29   Ht (in):    72     Wt (lb):    236 Exam Type:     Complete Indications:     Edema ICD Codes:       782.3 CPT Codes:       90275 BP:   165    /   125    HR: Technical Quality:       Fair MEASUREMENTS  (Male / Female) Normal Values 2D ECHO LV Diastolic Diameter PLAX        5.4 cm                4.2 - 5.9 / 3.9 - 5.3 cm LV Systolic Diameter PLAX         3.8 cm                2.1 - 4.0 cm IVS Diastolic Thickness           0.88 cm               LVPW Diastolic Thickness          0.88 cm               LVOT Diameter                     2 cm                  Estimated LV Ejection Fraction    65 %                  LV Ejection Fraction MOD BP       58.8 %                >= 55  % LV Ejection Fraction MOD 4C       51.4 %                LV Ejection Fraction MOD 2C        62.5 %                DOPPLER AV Peak Velocity                  0.96 m/s              AV Peak Gradient                  3.7 mmHg              AV Mean Gradient                  2 mmHg                LVOT Peak Velocity                1.1 m/s               AV Area Cont Eq vti               3.8 cm²               Mitral E Point Velocity           1 m/s                 Mitral E to A Ratio               0.96                  MV Pressure Half Time             44.9 ms               MV Area PHT                       4.9 cm²               MV Deceleration Time              155 ms                PV Peak Velocity                  1.1 m/s               PV Peak Gradient                  4.8 mmHg              RVOT Peak Velocity                0.75 m/s              MV E' Velocity                    6.8 cm/s              * Indicates values subject to auto-interpretation LV EF:  65    % FINDINGS Left Ventricle Normal left ventricular size, wall thickness, and systolic function. Left ventricular ejection fraction is visually estimated to be 65%. Normal regional wall motion. Normal diastolic function. Right Ventricle Normal right ventricular size and systolic function. Right Atrium Normal right atrial size.  Normal inferior vena cava size and inspiratory collapse. Left Atrium Normal left atrial size. Mitral Valve Structurally normal mitral valve. Trace mitral regurgitation. Aortic Valve Structurally normal aortic valve without significant stenosis or regurgitation. Tricuspid Valve Structurally normal tricuspid valve without significant stenosis or regurgitation. Unable to estimate pulmonary artery pressure due to an inadequate tricuspid regurgitant jet. Pulmonic Valve The pulmonic valve is not well visualized. Pericardium No effusion. Aorta Normal aortic root for body surface area. Fransisca Harrell M.D. (Electronically Signed) Final Date:     22 April 2018                 23:37     Assessment/Plan     Anticipate that patient will  need greater than 2 midnights for management of the discussed medical issues.    * Decompensated HCV cirrhosis (CMS-HCC)   Assessment & Plan    Patient is a known history of cirrhosis in the setting of HCV. He is followed by a gastroenterologist in [place] but is not on a transplant list. He has quickly decompensated despite a 7 L thoracentesis yesterday and is now status post a 5 L thoracentesis in the emergency room. He has been medically noncompliant secondary to financial difficulties with affording his medications after his discharge. He did not fill his antibiotics. I will restart his IV ceftriaxone and continue his home spironolactone, prednisone, and Lasix. I have requested social work assistance with helping this patient in finding to me any resources to afford his antibiotics until he is able to go back to [place].        SBP (spontaneous bacterial peritonitis) (CMS-[facility])- (present on admission)   Assessment & Plan    This is a previous diagnosis, treatment as noted above.        Fluid overload- (present on admission)   Assessment & Plan    Treatment as noted above.        Tobacco abuse   Assessment & Plan    Greater than 3 minutes spent at bedside and tobacco cessation counseling, nicotine replacement ordered and we will continue to encourage cessation.          Prophylaxis: Sequential compression devices for DVT prophylaxis, no PPI indicated, bowel protocol

## 2018-04-25 NOTE — ED NOTES
Pt reports increased sob, abd pain and abd swelling. Pt recently d/c'd from hospital with sbp. Pt reports he has paracentesis yesterday with 7L of fluid removed. Pt in nad at this time.

## 2018-04-26 VITALS
SYSTOLIC BLOOD PRESSURE: 143 MMHG | RESPIRATION RATE: 18 BRPM | BODY MASS INDEX: 30.27 KG/M2 | HEART RATE: 65 BPM | HEIGHT: 73 IN | OXYGEN SATURATION: 98 % | DIASTOLIC BLOOD PRESSURE: 90 MMHG | WEIGHT: 228.4 LBS | TEMPERATURE: 98.6 F

## 2018-04-26 LAB
ANION GAP SERPL CALC-SCNC: 6 MMOL/L (ref 0–11.9)
BUN SERPL-MCNC: 46 MG/DL (ref 8–22)
CALCIUM SERPL-MCNC: 7.2 MG/DL (ref 8.5–10.5)
CHLORIDE SERPL-SCNC: 115 MMOL/L (ref 96–112)
CO2 SERPL-SCNC: 16 MMOL/L (ref 20–33)
CREAT SERPL-MCNC: 1.63 MG/DL (ref 0.5–1.4)
ERYTHROCYTE [DISTWIDTH] IN BLOOD BY AUTOMATED COUNT: 49.1 FL (ref 35.9–50)
GLUCOSE SERPL-MCNC: 108 MG/DL (ref 65–99)
HCT VFR BLD AUTO: 34.1 % (ref 42–52)
HGB BLD-MCNC: 10.6 G/DL (ref 14–18)
MCH RBC QN AUTO: 27.6 PG (ref 27–33)
MCHC RBC AUTO-ENTMCNC: 31.1 G/DL (ref 33.7–35.3)
MCV RBC AUTO: 88.8 FL (ref 81.4–97.8)
PLATELET # BLD AUTO: 455 K/UL (ref 164–446)
PMV BLD AUTO: 8.8 FL (ref 9–12.9)
POTASSIUM SERPL-SCNC: 4.3 MMOL/L (ref 3.6–5.5)
RBC # BLD AUTO: 3.84 M/UL (ref 4.7–6.1)
SODIUM SERPL-SCNC: 137 MMOL/L (ref 135–145)
WBC # BLD AUTO: 14.2 K/UL (ref 4.8–10.8)

## 2018-04-26 PROCEDURE — 36415 COLL VENOUS BLD VENIPUNCTURE: CPT

## 2018-04-26 PROCEDURE — 700102 HCHG RX REV CODE 250 W/ 637 OVERRIDE(OP): Performed by: HOSPITALIST

## 2018-04-26 PROCEDURE — A9270 NON-COVERED ITEM OR SERVICE: HCPCS | Performed by: HOSPITALIST

## 2018-04-26 PROCEDURE — 85027 COMPLETE CBC AUTOMATED: CPT

## 2018-04-26 PROCEDURE — 700111 HCHG RX REV CODE 636 W/ 250 OVERRIDE (IP): Performed by: HOSPITALIST

## 2018-04-26 PROCEDURE — 99239 HOSP IP/OBS DSCHRG MGMT >30: CPT | Performed by: HOSPITALIST

## 2018-04-26 PROCEDURE — 80048 BASIC METABOLIC PNL TOTAL CA: CPT

## 2018-04-26 RX ORDER — SPIRONOLACTONE 100 MG/1
100 TABLET, FILM COATED ORAL DAILY
Qty: 30 TAB | Refills: 3 | Status: SHIPPED | OUTPATIENT
Start: 2018-04-26

## 2018-04-26 RX ORDER — PREDNISONE 20 MG/1
80 TABLET ORAL EVERY MORNING
Qty: 120 TAB | Refills: 0 | Status: SHIPPED | OUTPATIENT
Start: 2018-04-26

## 2018-04-26 RX ORDER — FUROSEMIDE 80 MG
80 TABLET ORAL EVERY MORNING
Qty: 30 TAB | Refills: 0 | Status: SHIPPED | OUTPATIENT
Start: 2018-04-26

## 2018-04-26 RX ORDER — LISINOPRIL 20 MG/1
20 TABLET ORAL EVERY MORNING
Qty: 30 TAB | Refills: 0 | Status: SHIPPED | OUTPATIENT
Start: 2018-04-26

## 2018-04-26 RX ORDER — CIPROFLOXACIN 500 MG/1
500 TABLET, FILM COATED ORAL EVERY 12 HOURS
Status: DISCONTINUED | OUTPATIENT
Start: 2018-04-26 | End: 2018-04-26 | Stop reason: HOSPADM

## 2018-04-26 RX ORDER — CEFDINIR 300 MG/1
300 CAPSULE ORAL 2 TIMES DAILY
Qty: 8 CAP | Refills: 0 | Status: SHIPPED | OUTPATIENT
Start: 2018-04-26 | End: 2018-04-30

## 2018-04-26 RX ORDER — CIPROFLOXACIN 500 MG/1
500 TABLET, FILM COATED ORAL EVERY 12 HOURS
Status: DISCONTINUED | OUTPATIENT
Start: 2018-04-26 | End: 2018-04-26

## 2018-04-26 RX ADMIN — LISINOPRIL 20 MG: 20 TABLET ORAL at 08:19

## 2018-04-26 RX ADMIN — METOPROLOL TARTRATE 25 MG: 25 TABLET, FILM COATED ORAL at 08:18

## 2018-04-26 RX ADMIN — PREDNISONE 80 MG: 20 TABLET ORAL at 08:18

## 2018-04-26 RX ADMIN — OXYCODONE HYDROCHLORIDE 5 MG: 5 TABLET ORAL at 08:19

## 2018-04-26 RX ADMIN — NICOTINE 21 MG: 21 PATCH, EXTENDED RELEASE TRANSDERMAL at 06:15

## 2018-04-26 RX ADMIN — FUROSEMIDE 80 MG: 20 TABLET ORAL at 08:19

## 2018-04-26 RX ADMIN — SPIRONOLACTONE 100 MG: 100 TABLET, FILM COATED ORAL at 08:18

## 2018-04-26 ASSESSMENT — PAIN SCALES - GENERAL
PAINLEVEL_OUTOF10: 6
PAINLEVEL_OUTOF10: 4
PAINLEVEL_OUTOF10: 9

## 2018-04-26 NOTE — DISCHARGE PLANNING
Referral: Discharge Plan    Intervention: Per MD, patient will discharge home today and travel Saturday April 28th to [facility].  MD requested assistance with 3 days of medications, scripts received and faxed to the [facility].    SW met with pt at bedside.  Pt states, he will discharge today to his mother's house in [place] and travel to [place] Saturday.  This SW will provide a cab voucher from the pharmacy to his mother house.    Plan: Home with medication assistance, pending approval for the medications.

## 2018-04-26 NOTE — DISCHARGE INSTRUCTIONS
Paracentesis, Care After  Refer to this sheet in the next few weeks. These instructions provide you with information about caring for yourself after your procedure. Your health care provider may also give you more specific instructions. Your treatment has been planned according to current medical practices, but problems sometimes occur. Call your health care provider if you have any problems or questions after your procedure.    WHAT TO EXPECT AFTER THE PROCEDURE  After your procedure, it is common to have a small amount of clear fluid coming from the puncture site.    HOME CARE INSTRUCTIONS  · Return to your normal activities as told by your health care provider. Ask your health care provider what activities are safe for you.  · Take over-the-counter and prescription medicines only as told by your health care provider.  · Do not take baths, swim, or use a hot tub until your health care provider approves.  · Follow instructions from your health care provider about:  ¨ How to take care of your puncture site.  ¨ When and how you should change your bandage (dressing).  ¨ When you should remove your dressing.  · Check your puncture area every day signs of infection. Watch for:  ¨ Redness, swelling, or pain.  ¨ Fluid, blood, or pus.  · Keep all follow-up visits as told by your health care provider. This is important.    SEEK MEDICAL CARE IF:  · You have redness, swelling, or pain at your puncture site.  · You start to have more clear fluid coming from your puncture site.  · You have blood or pus coming from your puncture site.  · You have chills.  · You have a fever.    SEEK IMMEDIATE MEDICAL CARE IF:  · You develop chest pain or shortness of breath.  · You develop increasing pain, discomfort, or swelling in your abdomen.  · You feel dizzy or light-headed or you pass out.     This information is not intended to replace advice given to you by your health care provider. Make sure you discuss any questions you have with  your health care provider.     Document Released: 05/03/2016 Document Reviewed: 03/01/2016  [employer] Interactive Patient Education ©2016 [employer]    Discharge Instructions    Discharged to home by car with relative. Discharged via walking, hospital escort: Refused.  Special equipment needed: Not Applicable    Be sure to schedule a follow-up appointment with your primary care doctor or any specialists as instructed.     Discharge Plan:   Diet Plan: Discussed  Activity Level: Discussed  Confirmed Follow up Appointment: Patient to Call and Schedule Appointment  Confirmed Symptoms Management: Discussed  Medication Reconciliation Updated: Yes  Influenza Vaccine Indication: Not indicated: Previously immunized this influenza season and > 8 years of age    I understand that a diet low in cholesterol, fat, and sodium is recommended for good health. Unless I have been given specific instructions below for another diet, I accept this instruction as my diet prescription.   Other diet: hepatic diet    Special Instructions: None    · Is patient discharged on Warfarin / Coumadin?   No     Depression / Suicide Risk    As you are discharged from this [facility] facility, it is important to learn how to keep safe from harming yourself.    Recognize the warning signs:  · Abrupt changes in personality, positive or negative- including increase in energy   · Giving away possessions  · Change in eating patterns- significant weight changes-  positive or negative  · Change in sleeping patterns- unable to sleep or sleeping all the time   · Unwillingness or inability to communicate  · Depression  · Unusual sadness, discouragement and loneliness  · Talk of wanting to die  · Neglect of personal appearance   · Rebelliousness- reckless behavior  · Withdrawal from people/activities they love  · Confusion- inability to concentrate     If you or a loved one observes any of these behaviors or has concerns about self-harm, here's what you can  do:  · Talk about it- your feelings and reasons for harming yourself  · Remove any means that you might use to hurt yourself (examples: pills, rope, extension cords, firearm)  · Get professional help from the community (Mental Health, Substance Abuse, psychological counseling)  · Do not be alone:Call your Safe Contact- someone whom you trust who will be there for you.  · Call your local CRISIS HOTLINE 238-5738 or 172-257-6658  · Call your local Children's Mobile Crisis Response Team [place] (951) 216-3390 or www.[employer]  · Call the toll free National Suicide Prevention Hotlines   · National Suicide Prevention Lifeline 185-349-HVVK (6471)  · National Hope Line Network 800-SUICIDE (222-0857)

## 2018-04-26 NOTE — DISCHARGE PLANNING
Referral: Discharge Medications    Intervention: SW faxed the Approved Services Form to the [facility] ($33.27).  Pt was provided with written instruction on where to  the medications and a cab voucher to his mother's home.    Plan: Home with medications assistance.

## 2018-04-26 NOTE — PROGRESS NOTES
"Per documentation, patient has reached 1,000ml fluid restriction.  Patient requesting coffee.  RN educated on fluid restriction and importance to treatment of disease processes, patient states \"it's my decision how much I want to drink,\" and \"I will get up myself and get water from the sink.\"  Patient non-compliant with RN teaching.    "

## 2018-04-26 NOTE — PROGRESS NOTES
Discharging Patient home per physician order.  Discharged with self--left to [facility] pharmacy on foot. Cab voucher provided to patient's mothers home.  Demonstrated understanding of discharge instructions, follow up appointments, home medications, prescriptions, home care for surgical wound, and nursing care instructions for diet (pt non-compliant with diet).  Ambulating without assistance, voiding without difficulty, pain well controlled, tolerating oral medications, oxygen saturation greater than 90% on ra, tolerating diet.   Educational handouts re: paracentesis after care given and discussed.  Verbalized understanding of discharge instructions and educational handouts.  All questions answered.  Belongings with patient at time of discharge.

## 2018-04-26 NOTE — CARE PLAN
Problem: Safety  Goal: Will remain free from injury  Up ad luis eduardo with steady gait    Problem: Knowledge Deficit  Goal: Knowledge of disease process/condition, treatment plan, diagnostic tests, and medications will improve  Plan of care discussed with pt. Pt receptive however non-compliant with dietary restrictions    Problem: Pain Management  Goal: Pain level will decrease to patient's comfort goal  Oxycodone provided for pain control

## 2018-04-26 NOTE — PROGRESS NOTES
Patient refusing lab draw at this time. States he needs an ultrasound machine to draw labs, RN educated patient that lab is not qualified to use ultrasounds to draw labs. Patient was adamant on the necessity of an ultrasound machine and continued to refuse despite RN education on importance of labs per physician order. Lab to reassess at 0500.

## 2018-04-26 NOTE — PROGRESS NOTES
"Patient requested Macaroni and Cheese to eat. RN educated patient on the Na amounts and how that would affect his cirrhosis with ascites. Patient verbalized he understands why he is on an hepatic and fluid restriction diet but that he is going to do what he wants to do. Patient made Mac and Cheese himself and is not compliant with any education. Patient also educated that he is not allowed to leave the unit and smoke at this hospital location and if he does it then he will be asked to leave the hospital. Patient stated \"I haven't gone out and smoked at all so that's no problem\", even though it was given in report that this patient has a history of doing this.  "

## 2018-04-26 NOTE — PROGRESS NOTES
Bedside report received.  Assessment complete.  A&O x 4. Patient calls appropriately.  Patient up with no assist.   Patient has 10/10 pain. MAR med given  Denies N&V. Tolerating hepatic and 1,000ml fluid restriction diet.  Surgical paracentesis sites x2 are CDI.  + void, + flatus  Patient denies SOB.  Patient was educated on importance of fluid restriction, patient verbalized he understands and has no questions but still will drink when he wants to.  Review plan with of care with patient. Call light and personal belongings with in reach. Hourly rounding in place. All needs met at this time.

## 2018-04-26 NOTE — CARE PLAN
Problem: Knowledge Deficit  Goal: Knowledge of disease process/condition, treatment plan, diagnostic tests, and medications will improve  Outcome: PROGRESSING SLOWER THAN EXPECTED    Intervention: Assess knowledge level of disease process/condition, treatment plan, diagnostic tests, and medications  Patient states he understands what is going on with him, however, he does not comply with diet restriction or instruction      Problem: Pain Management  Goal: Pain level will decrease to patient's comfort goal  Outcome: PROGRESSING AS EXPECTED    Intervention: Follow pain managment plan developed in collaboration with patient and Interdisciplinary Team  Patient given PRN pain medication when appropriated and needed

## 2018-04-27 LAB
BACTERIA BLD CULT: NORMAL
SIGNIFICANT IND 70042: NORMAL
SITE SITE: NORMAL
SOURCE SOURCE: NORMAL

## 2018-04-27 NOTE — DISCHARGE SUMMARY
Hospital Medicine Discharge Note     Admit Date:  4/24/2018       Discharge Date:   4/26/2018    Attending Physician:  Lebron Padgett M.D.      Diagnoses (includes active and resolved):     Principal Problem:    Decompensated HCV cirrhosis (CMS-HCC) POA: Unknown, improved post-paracentesis  Active Problems:    SBP (spontaneous bacterial peritonitis) (CMS-HCC) POA: Yes, stable    Nephrotic syndrome POA: Yes    Tobacco abuse POA: Unknown    Fluid overload POA: Yes, improved    Hospital Summary (Brief Narrative):       43 y.o. male history nephrotic syndrome , HCV, cirrhosis,  SBP who presented on 4/24/2018 with abdominal pain and swelling.  He just been discharged with 7 L paracentesis. He quickly returned to ER with increasing abdominal distention and had 5 L removed.  Following his discharge day prior he stated he could not  his medications due to coverage only in [place].  Patient was placed on Lasix, Aldactone  .  Re-analysis of his paracentesis fluid revealed a neutrophil count of 292 consistent with SBP .  Later cultures were negative.  He originally was discharged on Omnicef 7 day course and advised to continue.  We provided several days of discharge medications with the help SS.  In addition,  scripts were printed so he could fill them in [facility] when he planned to return on Saturday.  Feeling markedly better,  patient was discharged home and advised to follow-up with his primary care provider in [place].  He was advised to follow up for referral to GI in [place] and eventual  transplant program.     Consultants:        None     Imaging/ Testing:      No orders to display         Procedures:          Paracentesis with 5 L fluid removal on 4-24-18     Discharge Medications:             Medication List      CONTINUE taking these medications      Instructions   cefdinir 300 MG Caps  Commonly known as:  OMNICEF   Take 1 Cap by mouth 2 times a day for 4 days.  Dose:  300 mg      furosemide 80 MG Tabs  Commonly known as:  LASIX   Take 1 Tab by mouth every morning.  Dose:  80 mg     lisinopril 20 MG Tabs  Commonly known as:  PRINIVIL   Take 1 Tab by mouth every morning.  Dose:  20 mg     metoprolol 25 MG Tabs  Commonly known as:  LOPRESSOR   Take 1 Tab by mouth 2 Times a Day.  Dose:  25 mg     predniSONE 20 MG Tabs  Commonly known as:  DELTASONE   Take 4 Tabs by mouth every morning.  Dose:  80 mg     spironolactone 100 MG Tabs  Commonly known as:  ALDACTONE   Take 1 Tab by mouth every day.  Dose:  100 mg               Diet:       DIET ORDERS (Through next 24h)    Low Na,            Activity:   As tolerated.      Code status:   Full code    Primary Care Provider:    No primary care provider on file.    Follow up appointment details :      .  Primary care provider  in [facility] in 1 week.                  Time spent on discharge day patient visit: 40 minutes    #################################################

## 2018-04-28 LAB
BACTERIA FLD AEROBE CULT: NORMAL
GRAM STN SPEC: NORMAL
SIGNIFICANT IND 70042: NORMAL
SITE SITE: NORMAL
SOURCE SOURCE: NORMAL

## 2018-05-20 NOTE — ADDENDUM NOTE
Encounter addended by: TOMMY Brandt on: 5/20/2018  8:50 AM<BR>    Actions taken: Delete clinical note